# Patient Record
Sex: FEMALE | Race: WHITE | NOT HISPANIC OR LATINO | Employment: OTHER | URBAN - METROPOLITAN AREA
[De-identification: names, ages, dates, MRNs, and addresses within clinical notes are randomized per-mention and may not be internally consistent; named-entity substitution may affect disease eponyms.]

---

## 2017-01-18 DIAGNOSIS — I10 ESSENTIAL HYPERTENSION: ICD-10-CM

## 2017-01-18 DIAGNOSIS — E78.5 HYPERLIPIDEMIA, UNSPECIFIED HYPERLIPIDEMIA TYPE: ICD-10-CM

## 2017-01-19 RX ORDER — LOVASTATIN 20 MG/1
TABLET ORAL
Qty: 90 TAB | Refills: 0 | Status: SHIPPED | OUTPATIENT
Start: 2017-01-19 | End: 2017-12-29 | Stop reason: SDUPTHER

## 2017-01-19 NOTE — TELEPHONE ENCOUNTER
Pt has not 2had OV within the 12 month protocol and lipid panel is from 11/14. 3 month supply sent to pharmacy. Please schedule patient for an appt and fasting labs.  Lab Results   Component Value Date/Time    CHOLESTEROL, 11/06/2014 09:20 AM    LDL 98 11/06/2014 09:20 AM    HDL 47 11/06/2014 09:20 AM    TRIGLYCERIDES 111 11/06/2014 09:20 AM       Lab Results   Component Value Date/Time    SODIUM 139 11/06/2014 09:20 AM    POTASSIUM 4.0 11/06/2014 09:20 AM    CHLORIDE 104 11/06/2014 09:20 AM    CO2 27 11/06/2014 09:20 AM    GLUCOSE 90 11/06/2014 09:20 AM    BUN 14 11/06/2014 09:20 AM    CREATININE 0.91 11/06/2014 09:20 AM     Lab Results   Component Value Date/Time    ALKALINE PHOSPHATASE 81 11/06/2014 09:20 AM    AST(SGOT) 15 11/06/2014 09:20 AM    ALT(SGPT) 14 11/06/2014 09:20 AM    TOTAL BILIRUBIN 0.5 11/06/2014 09:20 AM

## 2017-03-28 RX ORDER — METOPROLOL SUCCINATE 25 MG/1
TABLET, EXTENDED RELEASE ORAL
Qty: 30 TAB | Refills: 0 | Status: SHIPPED | OUTPATIENT
Start: 2017-03-28 | End: 2017-07-02 | Stop reason: SDUPTHER

## 2017-03-28 NOTE — TELEPHONE ENCOUNTER
Pt was told 1/17 to make appt prior to more refills and that has not been done. Please advise pt to make appt and get labs done as next request will be denied.

## 2017-03-28 NOTE — TELEPHONE ENCOUNTER
Was the patient seen in the last year in this department? No     Does patient have an active prescription for medications requested? No     Received Request Via: Patient      Pt met protocol?: No, last ov 12/16/15, last labs 11/6/14  BP Readings from Last 1 Encounters:   12/16/15 128/88

## 2017-05-10 ENCOUNTER — OFFICE VISIT (OUTPATIENT)
Dept: MEDICAL GROUP | Facility: PHYSICIAN GROUP | Age: 62
End: 2017-05-10
Payer: COMMERCIAL

## 2017-05-10 VITALS
BODY MASS INDEX: 40.98 KG/M2 | HEART RATE: 88 BPM | DIASTOLIC BLOOD PRESSURE: 82 MMHG | RESPIRATION RATE: 14 BRPM | HEIGHT: 66 IN | WEIGHT: 255 LBS | OXYGEN SATURATION: 95 % | TEMPERATURE: 97 F | SYSTOLIC BLOOD PRESSURE: 124 MMHG

## 2017-05-10 DIAGNOSIS — F41.0 PANIC ATTACK: ICD-10-CM

## 2017-05-10 DIAGNOSIS — Z12.39 SCREENING FOR BREAST CANCER: ICD-10-CM

## 2017-05-10 DIAGNOSIS — E66.01 MORBID OBESITY WITH BMI OF 40.0-44.9, ADULT (HCC): ICD-10-CM

## 2017-05-10 DIAGNOSIS — I10 ESSENTIAL HYPERTENSION: ICD-10-CM

## 2017-05-10 DIAGNOSIS — Z12.11 SCREENING FOR COLON CANCER: ICD-10-CM

## 2017-05-10 DIAGNOSIS — M77.12 LEFT TENNIS ELBOW: ICD-10-CM

## 2017-05-10 DIAGNOSIS — E78.5 HYPERLIPIDEMIA, UNSPECIFIED HYPERLIPIDEMIA TYPE: ICD-10-CM

## 2017-05-10 PROCEDURE — 99203 OFFICE O/P NEW LOW 30 MIN: CPT | Performed by: INTERNAL MEDICINE

## 2017-05-10 RX ORDER — METOPROLOL SUCCINATE 25 MG/1
25 TABLET, EXTENDED RELEASE ORAL DAILY
Qty: 30 TAB | Refills: 5 | Status: SHIPPED | OUTPATIENT
Start: 2017-05-10 | End: 2017-05-10

## 2017-05-10 RX ORDER — CLONAZEPAM 0.5 MG/1
0.25 TABLET ORAL 2 TIMES DAILY PRN
Qty: 30 TAB | Refills: 0 | Status: SHIPPED | OUTPATIENT
Start: 2017-05-10 | End: 2018-11-07 | Stop reason: SDUPTHER

## 2017-05-10 ASSESSMENT — PATIENT HEALTH QUESTIONNAIRE - PHQ9: CLINICAL INTERPRETATION OF PHQ2 SCORE: 2

## 2017-05-10 NOTE — MR AVS SNAPSHOT
"        Rica Barrera   5/10/2017 10:40 AM   Office Visit   MRN: [id number]    Department:  [facility]   Dept Phone:  [phone]    Description:  Female : 1955   Provider:  RESOURCE PROVIDER [facility]NY           Reason for Visit     Medication Refill clonazepam, metoprolol,     Elbow Pain lft elbow pain x 10 days      Allergies as of 5/10/2017     No Known Allergies      You were diagnosed with     Screening for breast cancer   [229862]       Screening for colon cancer   [251615]       Morbid obesity with BMI of 40.0-44.9, adult (CMS-HCC)   [048122]       Left tennis elbow   [063383]       Panic attack   [524851]       Hyperlipidemia, unspecified hyperlipidemia type   [0190288]       Essential hypertension   [3455369]         Vital Signs     Blood Pressure Pulse Temperature Respirations Height Weight    124/82 mmHg 88 36.1 °C (97 °F) 14 1.676 m (5' 5.98\") 115.667 kg (255 lb)    Body Mass Index Oxygen Saturation Smoking Status             41.18 kg/m2 95% Former Smoker         Basic Information     Date Of Birth Sex Race Ethnicity Preferred Language    1955 Female White Non- English      Problem List              ICD-10-CM Priority Class Noted - Resolved    HLD (hyperlipidemia) E78.5   3/17/2014 - Present    Panic attack F41.0   3/17/2014 - Present    PAF (paroxysmal atrial fibrillation) (CMS-HCC) I48.0   3/17/2014 - Present    Abnormal finding on thallium stress test R94.39   3/17/2014 - Present    Breast cancer screening Z12.39   2014 - Present    Essential hypertension I10   2016 - Present    Morbid obesity with BMI of 40.0-44.9, adult ([facility]) E66.01, Z68.41   5/10/2017 - Present    Left tennis elbow M77.12   5/10/2017 - Present      Health Maintenance        Date Due Completion Dates    IMM DTaP/Tdap/Td Vaccine (1 - Tdap) 1972 ---    COLONOSCOPY 2003 ---    MAMMOGRAM 3/23/2007 3/23/2006    IMM ZOSTER VACCINE 2013 ---    PAP SMEAR 10/28/2017 10/28/2014 (Declined)   " Override on 10/28/2014: Patient Declined (followed by GYN)            Current Immunizations     No immunizations on file.      Below and/or attached are the medications your provider expects you to take. Review all of your home medications and newly ordered medications with your provider and/or pharmacist. Follow medication instructions as directed by your provider and/or pharmacist. Please keep your medication list with you and share with your provider. Update the information when medications are discontinued, doses are changed, or new medications (including over-the-counter products) are added; and carry medication information at all times in the event of emergency situations     Allergies:  No Known Allergies          Medications  Valid as of: May 10, 2017 - 11:06 AM    Generic Name Brand Name Tablet Size Instructions for use    Aspirin (Tab) aspirin 81 MG Take 162 mg by mouth every day.        B Complex Vitamins   Take  by mouth.        ClonazePAM (Tab) KLONOPIN 0.5 MG Take 0.5 Tabs by mouth 2 times a day as needed.        Coenzyme Q10   Take  by mouth every day.        Lovastatin (Tab) MEVACOR 20 MG TAKE ONE TABLET BY MOUTH ONCE DAILY        Magnesium   Take  by mouth.        Metoprolol Succinate (TABLET SR 24 HR) TOPROL XL 25 MG TAKE ONE-HALF TABLET BY MOUTH ONCE DAILY        .                 Medicines prescribed today were sent to:     [facility] [address] [place] [id number]    Phone: [phone] Fax: [phone]    Open 24 Hours?: No      Medication refill instructions:       If your prescription bottle indicates you have medication refills left, it is not necessary to call your provider’s office. Please contact your pharmacy and they will refill your medication.    If your prescription bottle indicates you do not have any refills left, you may request refills at any time through one of the following ways: The online [employer] system  (except Urgent Care), by calling your provider’s office, or by asking your pharmacy to contact your provider’s office with a refill request. Medication refills are processed only during regular business hours and may not be available until the next business day. Your provider may request additional information or to have a follow-up visit with you prior to refilling your medication.   *Please Note: Medication refills are assigned a new Rx number when refilled electronically. Your pharmacy may indicate that no refills were authorized even though a new prescription for the same medication is available at the pharmacy. Please request the medicine by name with the pharmacy before contacting your provider for a refill.        Your To Do List     Future Labs/Procedures Complete By Expires    COMP METABOLIC PANEL  As directed 5/11/2018    LIPID PROFILE  As directed 5/11/2018    MA-SCREEN MAMMO W/CAD-BILAT  As directed 5/10/2018      Referral     A referral request has been sent to our patient care coordination department. Please allow 3-5 business days for us to process this request and contact you either by phone or mail. If you do not hear from us by the 5th business day, please call us at [phone].           [employer] Access Code: Activation code not generated  Current [employer] Status: Active

## 2017-05-10 NOTE — PROGRESS NOTES
Chief Complaint   Patient presents with   • Medication Refill     clonazepam, metoprolol,    • Elbow Pain     lft elbow pain x 10 days       HISTORY OF PRESENT ILLNESS: Patient is a 61 y.o. female patient who presents today to discuss the evaluation and management of the following: She has not been in the office since December 2015, and has not had blood work since 2014.    1. Screening for breast cancer    Patient has not had a mammogram since 2006.    2. Screening for colon cancer    Patient has never had a colonoscopy, she has done guaiacs in the remote past. 2 of her children have Crohn's disease there is no history of colon cancer.    3. Morbid obesity with BMI of 40.0-44.9, adult (CMS-[facility])    We note that the patient has gained almost 20 pounds since her last visit here. She has however quit smoking successfully. She was smoking one pack per day for 50 years prior to November when she stopped. She also has retired from work and with these changes has been eating a lot of sweets she does a lot of baking for her grandchildren. She does not do any regular aerobic exercise, she takes slow walks with her dog daily.    4. Left tennis elbow    Patient is complaining of left elbow pain for about 10 days. It started after she was doing some gardening and using clippers. She has tried taking Advil OTC with some relief.    5. Panic attack    Patient has rare panic attacks for which she takes one half of a clonazepam tablet. Her last prescription was greater than one year ago.    6. Hyperlipidemia, unspecified hyperlipidemia type    Most recent labs were in 2014. Patient was taking lovastatin 20 mg, however she ran out several months ago. She is not currently watching her diet at all. We agree to check labs today and resume medication if indicated.    7. Essential hypertension          Patient Active Problem List    Diagnosis Date Noted   • Morbid obesity with BMI of 40.0-44.9, adult ([facility]) 05/10/2017   • Left tennis elbow  "05/10/2017   • Essential hypertension 08/25/2016   • Breast cancer screening 11/12/2014   • HLD (hyperlipidemia) 03/17/2014   • Panic attack 03/17/2014   • PAF (paroxysmal atrial fibrillation) (CMS-HCC) 03/17/2014   • Abnormal finding on thallium stress test 03/17/2014      Allergies:Review of patient's allergies indicates no known allergies.    Current meds including changes today  Current Outpatient Prescriptions   Medication Sig Dispense Refill   • MAGNESIUM PO Take  by mouth.     • B Complex Vitamins (VITAMIN B COMPLEX PO) Take  by mouth.     • clonazepam (KLONOPIN) 0.5 MG Tab Take 0.5 Tabs by mouth 2 times a day as needed. 30 Tab 0   • metoprolol SR (TOPROL XL) 25 MG TABLET SR 24 HR TAKE ONE-HALF TABLET BY MOUTH ONCE DAILY 30 Tab 0   • aspirin 81 MG tablet Take 162 mg by mouth every day.     • Coenzyme Q10 (CO Q 10 PO) Take  by mouth every day.     • lovastatin (MEVACOR) 20 MG Tab TAKE ONE TABLET BY MOUTH ONCE DAILY 90 Tab 0     No current facility-administered medications for this visit.     Social History   Substance Use Topics   • Smoking status: Former Smoker -- 1.00 packs/day     Types: Cigarettes     Quit date: 11/01/2015   • Smokeless tobacco: Never Used   • Alcohol Use: No     Social History     Social History Narrative       Family History   Problem Relation Age of Onset   • Heart Disease Mother    • Hypertension Mother    • Dementia Father    • Cancer Father      spine   • Diabetes Father    • Cancer Sister 41     breast   • Cancer Maternal Grandmother      leukemia       Review of Systems:  No chest pain, No shortness of breath, No dyspnea on exertion  Gastrointestinal ROS: No abdominal pain, No nausea, vomiting, diarrhea, or constipation        Exam:    Pleasant overweight female no distress  Blood pressure 124/82, pulse 88, temperature 36.1 °C (97 °F), resp. rate 14, height 1.676 m (5' 5.98\"), weight 115.667 kg (255 lb), SpO2 95 %.  General:  Well nourished, well developed female in NAD affect and " mood within normal limits  Head is grossly normal.  Neck: Supple without adenopathy  Pulmonary: Clear to ausculation.  Normal effort. No rales, rhonchi, or wheezing.  Cardiovascular: Regular rate and rhythm without murmur.   Extremities: no clubbing, cyanosis, or edema. Point tenderness above left lateral epicondyle.   Neuro: moves all extremities symmetrically    Please note that this dictation was created using voice recognition software. I have made every reasonable attempt to correct obvious errors, but I expect that there are errors of grammar and possibly content that I did not discover before finalizing the note.    Assessment/Plan:  1. Screening for breast cancer      - MA-SCREEN MAMMO W/CAD-BILAT; Future    2. Screening for colon cancer    Patient agrees to have screening colonoscopy.  - REFERRAL TO GASTROENTEROLOGY    3. Morbid obesity with BMI of 40.0-44.9, adult (CMS-[facility])    Discussed starting an aerobic exercise program, and trying to cut down on the amount of sweets that she is eating.  - Patient identified as having weight management issue.  Appropriate orders and counseling given.    4. Left tennis elbow    Discussed icing and use of anti-inflammatories, and avoiding exacerbating activities. Patient declines referral for PT she will see how it feels in a few weeks.    5. Panic attack    [place]  checked, patient is utilizing refills appropriately. Last refill January 2016.  - clonazepam (KLONOPIN) 0.5 MG Tab; Take 0.5 Tabs by mouth 2 times a day as needed.  Dispense: 30 Tab; Refill: 0    6. Hyperlipidemia, unspecified hyperlipidemia type    Check fasting lipids off medications, reinstitute if indicated.  - LIPID PROFILE; Future    7. Essential hypertension    Patient currently on very low-dose metoprolol, this was actually started for paroxysmal atrial fibrillation. I advised her that she could probably stop it at this point and we could just follow her blood pressure.  - COMP METABOLIC PANEL;  Future    Followup: Return in about 6 months (around 11/10/2017).

## 2017-07-03 NOTE — TELEPHONE ENCOUNTER
Was the patient seen in the last year in this department? Yes     Does patient have an active prescription for medications requested? No     Received Request Via: Pharmacy      Pt met protocol?: Yes, OV 5/10, but pt still needs to get labs done.   BP Readings from Last 1 Encounters:   05/10/17 124/82

## 2017-07-05 RX ORDER — METOPROLOL SUCCINATE 25 MG/1
TABLET, EXTENDED RELEASE ORAL
Qty: 30 TAB | Refills: 0 | Status: SHIPPED | OUTPATIENT
Start: 2017-07-05 | End: 2017-10-27 | Stop reason: SDUPTHER

## 2017-07-05 NOTE — TELEPHONE ENCOUNTER
Last seen by PCP 5/17. Discussed med DC. Will send 2 months to pharmacy. Please call and remind patient to get labs done.

## 2017-10-11 ENCOUNTER — TELEPHONE (OUTPATIENT)
Dept: MEDICAL GROUP | Facility: PHYSICIAN GROUP | Age: 62
End: 2017-10-11

## 2017-10-11 NOTE — TELEPHONE ENCOUNTER
----- Message from Maria Isabel Hassan M.D. sent at 10/11/2017 10:03 AM PDT -----  Mild hyperlipidemia and slight elevation in cholesterol.  Encourage consuming a dietary pattern that emphasizes intake of vegetables, fruits, and whole grains; includes low-fat dairy products, poultry, fish, legumes, nontropical vegetable oils and nuts; and limits intake of sweets, sugar-sweetened beverages and red meats.  Look up DASH diet for more information.

## 2017-10-30 NOTE — TELEPHONE ENCOUNTER
Was the patient seen in the last year in this department? Yes     Does patient have an active prescription for medications requested? No     Received Request Via: Pharmacy      Pt met protocol?: Yes    LAST OV 5/17

## 2017-10-31 RX ORDER — METOPROLOL SUCCINATE 25 MG/1
TABLET, EXTENDED RELEASE ORAL
Qty: 45 TAB | Refills: 0 | Status: SHIPPED | OUTPATIENT
Start: 2017-10-31 | End: 2017-12-29 | Stop reason: SDUPTHER

## 2017-12-29 ENCOUNTER — OFFICE VISIT (OUTPATIENT)
Dept: MEDICAL GROUP | Facility: PHYSICIAN GROUP | Age: 62
End: 2017-12-29
Payer: COMMERCIAL

## 2017-12-29 VITALS
OXYGEN SATURATION: 97 % | HEIGHT: 66 IN | WEIGHT: 243 LBS | DIASTOLIC BLOOD PRESSURE: 72 MMHG | TEMPERATURE: 97.1 F | SYSTOLIC BLOOD PRESSURE: 124 MMHG | HEART RATE: 84 BPM | BODY MASS INDEX: 39.05 KG/M2 | RESPIRATION RATE: 18 BRPM

## 2017-12-29 DIAGNOSIS — Z12.11 SCREENING FOR COLON CANCER: ICD-10-CM

## 2017-12-29 DIAGNOSIS — Z23 NEED FOR VACCINATION: ICD-10-CM

## 2017-12-29 DIAGNOSIS — E78.5 HYPERLIPIDEMIA, UNSPECIFIED HYPERLIPIDEMIA TYPE: ICD-10-CM

## 2017-12-29 DIAGNOSIS — E66.9 OBESITY (BMI 35.0-39.9 WITHOUT COMORBIDITY): ICD-10-CM

## 2017-12-29 DIAGNOSIS — I10 ESSENTIAL HYPERTENSION: ICD-10-CM

## 2017-12-29 DIAGNOSIS — I48.0 PAF (PAROXYSMAL ATRIAL FIBRILLATION) (HCC): ICD-10-CM

## 2017-12-29 DIAGNOSIS — E55.9 VITAMIN D DEFICIENCY: ICD-10-CM

## 2017-12-29 DIAGNOSIS — Z12.39 SCREENING FOR BREAST CANCER: ICD-10-CM

## 2017-12-29 PROCEDURE — 99214 OFFICE O/P EST MOD 30 MIN: CPT | Mod: 25 | Performed by: FAMILY MEDICINE

## 2017-12-29 PROCEDURE — 90471 IMMUNIZATION ADMIN: CPT | Performed by: FAMILY MEDICINE

## 2017-12-29 PROCEDURE — 90686 IIV4 VACC NO PRSV 0.5 ML IM: CPT | Performed by: FAMILY MEDICINE

## 2017-12-29 RX ORDER — LOVASTATIN 20 MG/1
TABLET ORAL
Qty: 90 TAB | Refills: 3 | Status: SHIPPED | OUTPATIENT
Start: 2017-12-29 | End: 2019-03-08 | Stop reason: SDUPTHER

## 2017-12-29 RX ORDER — METOPROLOL SUCCINATE 25 MG/1
TABLET, EXTENDED RELEASE ORAL
Qty: 45 TAB | Refills: 3 | Status: SHIPPED | OUTPATIENT
Start: 2017-12-29 | End: 2019-01-22 | Stop reason: SDUPTHER

## 2017-12-29 NOTE — PROGRESS NOTES
Chief Complaint   Patient presents with   • Follow-Up     labs       HISTORY OF PRESENT ILLNESS: Patient is a 62 y.o. female established patient here today for the following concerns:    1. Need for vaccination  Due for flu vaccine.     2. Hyperlipidemia, unspecified hyperlipidemia type  Here for follow up On labs which demonstrate return of hyperlipidemia. She was previously on lovastatin but had stopped it when she ran out of the medication. No history of coronary artery disease, history of paroxysmal atrial fibrillation on rate control and aspirin alone. Continues to smoke.    3. Vitamin D deficiency  Patient reports difficulty with muscle aches and joint pains. Previous history of vitamin D deficiency not currently taking any replacement.    4. PAF (paroxysmal atrial fibrillation) (CMS-[facility])  History of paroxysmal atrial fibrillation. Reports she has not had any palpitations dizziness lightheadedness or chest pains. Continues on the metoprolol and aspirin.    5. Essential hypertension  Blood pressure is been well controlled with metoprolol. Denies any headaches visual changes or chest pains.    6. Screening for breast cancer  Due for mammography.    7. Screening for colon cancer  Due for colon cancer screening, left foot testing over colonoscopy.    8. Obesity (BMI 35.0-39.9 without comorbidity)  Counseled on weight reduction today      Past Medical, Social, and Family history reviewed and updated in EPIC    Allergies:Patient has no known allergies.    Current Outpatient Prescriptions   Medication Sig Dispense Refill   • lovastatin (MEVACOR) 20 MG Tab TAKE ONE TABLET BY MOUTH ONCE DAILY 90 Tab 3   • metoprolol SR (TOPROL XL) 25 MG TABLET SR 24 HR TAKE ONE-HALF TABLET BY MOUTH ONCE DAILY 45 Tab 3   • MAGNESIUM PO Take  by mouth.     • B Complex Vitamins (VITAMIN B COMPLEX PO) Take  by mouth.     • clonazepam (KLONOPIN) 0.5 MG Tab Take 0.5 Tabs by mouth 2 times a day as needed. 30 Tab 0   • aspirin 81 MG tablet  "Take 162 mg by mouth every day.     • Coenzyme Q10 (CO Q 10 PO) Take  by mouth every day.       No current facility-administered medications for this visit.          ROS:  Review of Systems   Constitutional: Negative for fever, chills, weight loss and malaise/fatigue.   HENT: Negative for ear pain, nosebleeds, congestion, sore throat and neck pain.    Eyes: Negative for blurred vision.   Respiratory: Negative for cough, sputum production, shortness of breath and wheezing.    Cardiovascular: Negative for chest pain, palpitations,  and leg swelling.   Gastrointestinal: Negative for heartburn, nausea, vomiting, diarrhea and abdominal pain.   Genitourinary: Negative for dysuria, urgency and frequency.   Musculoskeletal: Negative for myalgias, back pain and joint pain.   Skin: Negative for rash and itching.   Neurological: Negative for dizziness, tingling, tremors, sensory change, focal weakness and headaches.   Endo/Heme/Allergies: Does not bruise/bleed easily.   Psychiatric/Behavioral: Negative for depression, anxiety, suicidal ideas, insomnia and memory loss.      Exam:  Blood pressure 124/72, pulse 84, temperature 36.2 °C (97.1 °F), resp. rate 18, height 1.676 m (5' 5.98\"), weight 110.2 kg (243 lb), SpO2 97 %.    General:  Well nourished, well developed in NAD  Head is grossly normal.  Neck: Supple without JVD   Pulmonary:  Normal effort.   Cardiovascular: Regular rate and rhythm no murmurs rubs or gallops  Extremities: no clubbing, cyanosis, or edema.  Psych: affect appropriate    Please note that this dictation was created using voice recognition software. I have made every reasonable attempt to correct obvious errors, but I expect that there are errors of grammar and possibly content that I did not discover before finalizing the note.    Assessment/Plan:  1. Need for vaccination  Counseled  - INFLUENZA VACCINE QUAD INJ >3Y(PF)    2. Hyperlipidemia, unspecified hyperlipidemia type  Restart lovastatin and check labs " in 3 months  - lovastatin (MEVACOR) 20 MG Tab; TAKE ONE TABLET BY MOUTH ONCE DAILY  Dispense: 90 Tab; Refill: 3  - COMP METABOLIC PANEL; Future  - LIPID PROFILE; Future    3. Vitamin D deficiency  Vitamin D 2000 5000 units daily check levels in the next 3 months  - VITAMIN D,25 HYDROXY; Future    4. PAF (paroxysmal atrial fibrillation) (CMS-HCC)  Continue  - metoprolol SR (TOPROL XL) 25 MG TABLET SR 24 HR; TAKE ONE-HALF TABLET BY MOUTH ONCE DAILY  Dispense: 45 Tab; Refill: 3  Continue aspirin daily    5. Essential hypertension  Continue  - metoprolol SR (TOPROL XL) 25 MG TABLET SR 24 HR; TAKE ONE-HALF TABLET BY MOUTH ONCE DAILY  Dispense: 45 Tab; Refill: 3    6. Screening for breast cancer  Check  - MA-SCREEN MAMMO W/CAD-BILAT; Future    7. Screening for colon cancer    - OCCULT BLOOD FECES IMMUNOASSAY; Future    8. Obesity (BMI 35.0-39.9 without comorbidity)    - Patient identified as having weight management issue.  Appropriate orders and counseling given.    Three-month follow-up sooner when necessary  Patient advised to schedule well woman exam for last Pap smear, if normal will discontinue screening

## 2018-02-16 ENCOUNTER — HOSPITAL ENCOUNTER (OUTPATIENT)
Dept: RADIOLOGY | Facility: MEDICAL CENTER | Age: 63
End: 2018-02-16
Attending: FAMILY MEDICINE
Payer: COMMERCIAL

## 2018-02-16 DIAGNOSIS — Z12.39 SCREENING FOR BREAST CANCER: ICD-10-CM

## 2018-02-16 PROCEDURE — 77067 SCR MAMMO BI INCL CAD: CPT

## 2018-11-07 ENCOUNTER — OFFICE VISIT (OUTPATIENT)
Dept: MEDICAL GROUP | Facility: PHYSICIAN GROUP | Age: 63
End: 2018-11-07
Payer: MEDICARE

## 2018-11-07 VITALS
BODY MASS INDEX: 41.3 KG/M2 | HEART RATE: 77 BPM | TEMPERATURE: 97.2 F | HEIGHT: 66 IN | OXYGEN SATURATION: 98 % | SYSTOLIC BLOOD PRESSURE: 110 MMHG | WEIGHT: 257 LBS | DIASTOLIC BLOOD PRESSURE: 62 MMHG

## 2018-11-07 DIAGNOSIS — Z12.11 SCREENING FOR COLON CANCER: ICD-10-CM

## 2018-11-07 DIAGNOSIS — Z23 NEED FOR INFLUENZA VACCINATION: ICD-10-CM

## 2018-11-07 DIAGNOSIS — S86.912A STRAIN OF LEFT KNEE, INITIAL ENCOUNTER: ICD-10-CM

## 2018-11-07 DIAGNOSIS — F41.0 PANIC ATTACK: ICD-10-CM

## 2018-11-07 DIAGNOSIS — Z23 NEED FOR VACCINATION AGAINST STREPTOCOCCUS PNEUMONIAE: ICD-10-CM

## 2018-11-07 DIAGNOSIS — Z13.6 SCREENING FOR CARDIOVASCULAR CONDITION: ICD-10-CM

## 2018-11-07 PROCEDURE — 90662 IIV NO PRSV INCREASED AG IM: CPT | Performed by: FAMILY MEDICINE

## 2018-11-07 PROCEDURE — G0009 ADMIN PNEUMOCOCCAL VACCINE: HCPCS | Performed by: FAMILY MEDICINE

## 2018-11-07 PROCEDURE — 90732 PPSV23 VACC 2 YRS+ SUBQ/IM: CPT | Performed by: FAMILY MEDICINE

## 2018-11-07 PROCEDURE — G0008 ADMIN INFLUENZA VIRUS VAC: HCPCS | Performed by: FAMILY MEDICINE

## 2018-11-07 PROCEDURE — 99214 OFFICE O/P EST MOD 30 MIN: CPT | Mod: 25 | Performed by: FAMILY MEDICINE

## 2018-11-07 RX ORDER — CLONAZEPAM 0.5 MG/1
0.25 TABLET ORAL 2 TIMES DAILY PRN
Qty: 30 TAB | Refills: 0 | Status: SHIPPED | OUTPATIENT
Start: 2018-11-07 | End: 2020-03-11 | Stop reason: SDUPTHER

## 2018-11-07 RX ORDER — MELOXICAM 7.5 MG/1
7.5 TABLET ORAL DAILY
Qty: 30 TAB | Refills: 0 | Status: SHIPPED
Start: 2018-11-07 | End: 2020-01-23 | Stop reason: CLARIF

## 2018-11-07 RX ORDER — ACETAMINOPHEN 160 MG
TABLET,DISINTEGRATING ORAL
COMMUNITY

## 2018-11-07 ASSESSMENT — PATIENT HEALTH QUESTIONNAIRE - PHQ9
SUM OF ALL RESPONSES TO PHQ QUESTIONS 1-9: 5
CLINICAL INTERPRETATION OF PHQ2 SCORE: 2
5. POOR APPETITE OR OVEREATING: 0 - NOT AT ALL

## 2018-11-07 NOTE — PROGRESS NOTES
Chief Complaint   Patient presents with   • Knee Injury     L knee pain x 1wk, pt fell out of truck       HISTORY OF PRESENT ILLNESS: Patient is a 63 y.o. female established patient here today for the following concerns:    1. Panic attack  Patient is here for follow-up on anxiety.  She gets occasional panic attacks that she is in the past used clonazepam to treat.  She reports that she very rarely needs some and her prescription is starting to run low.  And she thinks it may have even  since her last use.  Denies any increase in anxiety.  No chest pains or palpitations.    2. Need for influenza vaccination  Patient is due for flu shot today.    3. Strain of left knee, initial encounter  She reports approximately 1 week ago she missed the last step climbing out of her 's big rig.  She hyperextended the left knee and since then has been having a lot of pain and stiffness.  She reports that she is tried using neoprene knee sleeve however this is made patellar pain worse.  Primarily pain is in the posterior aspect of the knee.  It does feel better if she ambulates on the ball of her foot rather than flat.  She denies any bruising or swelling into the calf.  There is been no instability sensation or sticking.    4. Screening for cardiovascular condition  Due for updated labs  5. Screening for colon cancer  Due for colon cancer screening, declines colonoscopy amendable to annual fit testing    6. Need for vaccination against Streptococcus pneumoniae  Due for pneumonia vaccination      Past Medical, Social, and Family history reviewed and updated in EPIC    Allergies:Patient has no known allergies.    Current Outpatient Prescriptions   Medication Sig Dispense Refill   • Cholecalciferol (VITAMIN D3) 2000 UNIT Cap Take  by mouth.     • Potassium (POTASSIMIN PO) Take  by mouth.     • Black Pepper-Turmeric (TURMERIC COMPLEX/BLACK PEPPER PO) Take  by mouth.     • clonazePAM (KLONOPIN) 0.5 MG Tab Take 0.5 Tabs by  "mouth 2 times a day as needed for up to 30 days. 30 Tab 0   • meloxicam (MOBIC) 7.5 MG Tab Take 1 Tab by mouth every day. Take with food 30 Tab 0   • lovastatin (MEVACOR) 20 MG Tab TAKE ONE TABLET BY MOUTH ONCE DAILY 90 Tab 3   • metoprolol SR (TOPROL XL) 25 MG TABLET SR 24 HR TAKE ONE-HALF TABLET BY MOUTH ONCE DAILY 45 Tab 3   • MAGNESIUM PO Take  by mouth.     • B Complex Vitamins (VITAMIN B COMPLEX PO) Take  by mouth.     • aspirin 81 MG tablet Take 162 mg by mouth every day.     • Coenzyme Q10 (CO Q 10 PO) Take  by mouth every day.       No current facility-administered medications for this visit.          ROS:  Review of Systems   Constitutional: Negative for fever, chills, weight loss and malaise/fatigue.   HENT: Negative for ear pain, nosebleeds, congestion, sore throat and neck pain.    Eyes: Negative for blurred vision.   Respiratory: Negative for cough, sputum production, shortness of breath and wheezing.    Cardiovascular: Negative for chest pain, palpitations,  and leg swelling.   Gastrointestinal: Negative for heartburn, nausea, vomiting, diarrhea and abdominal pain.   Genitourinary: Negative for dysuria, urgency and frequency.   Musculoskeletal: Negative for myalgias, back pain and joint pain.   Skin: Negative for rash and itching.   Neurological: Negative for dizziness, tingling, tremors, sensory change, focal weakness and headaches.   Endo/Heme/Allergies: Does not bruise/bleed easily.   Psychiatric/Behavioral: Negative for depression, anxiety, suicidal ideas, insomnia and memory loss.      Exam:  Blood pressure 110/62, pulse 77, temperature 36.2 °C (97.2 °F), temperature source Temporal, height 1.676 m (5' 6\"), weight 116.6 kg (257 lb), SpO2 98 %.    General:  Well nourished, well developed in NAD  Head is grossly normal.  Neck: Supple without JVD   Pulmonary:  Normal effort.   Cardiovascular: Regular rate  Extremities: no clubbing, cyanosis, or edema.  Psych: affect appropriate  Muscular skeletal: " Left knee without palpable effusion some mild tenderness in the medial joint space.  Tenderness in the popliteal fossa, more tender on the insertion point of the popliteus/gastrocnemius muscles.  No ligamentous laxity appreciated although exam is limited by body habitus.    Please note that this dictation was created using voice recognition software. I have made every reasonable attempt to correct obvious errors, but I expect that there are errors of grammar and possibly content that I did not discover before finalizing the note.    Assessment/Plan:  1. Panic attack  Renewed, discussed risks benefits and alternatives  - clonazePAM (KLONOPIN) 0.5 MG Tab; Take 0.5 Tabs by mouth 2 times a day as needed for up to 30 days.  Dispense: 30 Tab; Refill: 0    2. Need for influenza vaccination  - INFLUENZA VACCINE, HIGH DOSE (65+ ONLY)    3. Strain of left knee, initial encounter  We will start physical therapy as well as meloxicam with food for 1 month.  Consider Ace bandage for little compression.  At this time no indication for x-ray or MRI.  - REFERRAL TO PHYSICAL THERAPY Reason for Therapy: Eval/Treat/Report  - meloxicam (MOBIC) 7.5 MG Tab; Take 1 Tab by mouth every day. Take with food  Dispense: 30 Tab; Refill: 0    4. Screening for cardiovascular condition  - COMP METABOLIC PANEL; Future  - LIPID PROFILE; Future    5. Screening for colon cancer  - OCCULT BLOOD FECES IMMUNOASSAY; Future    6. Need for vaccination against Streptococcus pneumoniae  - Pneumococal Polysaccharide Vaccine 23-Valent =>3YO SQ/IM    Follow-up in 4-6 weeks

## 2018-11-19 ENCOUNTER — APPOINTMENT (OUTPATIENT)
Dept: RADIOLOGY | Facility: IMAGING CENTER | Age: 63
End: 2018-11-19
Attending: NURSE PRACTITIONER
Payer: MEDICARE

## 2018-11-19 ENCOUNTER — OFFICE VISIT (OUTPATIENT)
Dept: URGENT CARE | Facility: CLINIC | Age: 63
End: 2018-11-19
Payer: MEDICARE

## 2018-11-19 VITALS
DIASTOLIC BLOOD PRESSURE: 72 MMHG | BODY MASS INDEX: 41.3 KG/M2 | RESPIRATION RATE: 16 BRPM | OXYGEN SATURATION: 98 % | HEART RATE: 80 BPM | WEIGHT: 257 LBS | TEMPERATURE: 98.8 F | SYSTOLIC BLOOD PRESSURE: 116 MMHG | HEIGHT: 66 IN

## 2018-11-19 DIAGNOSIS — M25.562 ACUTE PAIN OF LEFT KNEE: ICD-10-CM

## 2018-11-19 DIAGNOSIS — S86.912D KNEE STRAIN, LEFT, SUBSEQUENT ENCOUNTER: ICD-10-CM

## 2018-11-19 PROCEDURE — 73564 X-RAY EXAM KNEE 4 OR MORE: CPT | Mod: TC,FY,LT | Performed by: PHYSICIAN ASSISTANT

## 2018-11-19 PROCEDURE — 99213 OFFICE O/P EST LOW 20 MIN: CPT | Performed by: NURSE PRACTITIONER

## 2018-11-19 ASSESSMENT — ENCOUNTER SYMPTOMS
FALLS: 1
TINGLING: 0
SENSORY CHANGE: 0
FOCAL WEAKNESS: 0

## 2018-11-19 NOTE — PROGRESS NOTES
"Subjective:      Rica Barrera is a 63 y.o. female who presents with Knee Injury (RT knee injury)            Patient reports that 3 weeks ago she missed the last step climbing out of her 's big rig.  She hyperextended the left knee as she fell to the ground and since then has been having a lot of pain and stiffness.  She initially tried a neoprene sleeve which made it feel worse.  She denies any bruising or swelling into the calf.  There is been no instability sensation or locking.  No numbness, tingling, or weakness.  She was evaluated by her PCP 12 days ago and advised to trial Mobic and follow up with physical therapy.  She has tried the mobic with no relief.  Rates pain 8/10 with movement.  Pain improves with non-weight bearing rest.  She has not scheduled to see physical therapy.  She understands that x-ray is not likely to yield any findings regarding this acute injury, but is requesting baseline x-ray today.  She is nervous to try physical therapy fearing there is something significantly wrong with her knee that may need surgical repair.               Review of Systems   Musculoskeletal: Positive for falls and joint pain.   Neurological: Negative for tingling, sensory change and focal weakness.     Medications, Allergies, and current problem list reviewed today in Epic     Objective:     /72 (BP Location: Left arm, Patient Position: Sitting, BP Cuff Size: Large adult)   Pulse 80   Temp 37.1 °C (98.8 °F) (Temporal)   Resp 16   Ht 1.676 m (5' 6\")   Wt 116.6 kg (257 lb)   SpO2 98%   BMI 41.48 kg/m²      Physical Exam   Constitutional: She is oriented to person, place, and time. She appears well-developed and well-nourished. No distress.   Obese female with BMI of 41.48   Cardiovascular: Normal rate.    Musculoskeletal:   Left knee is warm, dry, and intact with no bruising, swelling, erythema, rash or lesion.  Obesity obscures landmarks.  Patella tracks midline.  No joint instability.  Posterior " knee TTP.  Negative anterior and posterior drawer.  Pain on varus and valgus stress.  ROM intact with pain on full extension.  No catching or locking.  NV and sensation intact.  Antalgic gait.     Neurological: She is alert and oriented to person, place, and time.   Skin: She is not diaphoretic.   Vitals reviewed.    [facility]  [address] [place] [address] [place] [address] Bruce, Harling[place] J  MRN: [id number], : 1955, Sex: F  Encounter date: 2018   Order   DX-KNEE COMPLETE 4+ LEFT [[id number]] (Order [id number])   Reviewed by SPENCER Craig on 2018  2:28 PM   Images     Show images for DX-KNEE COMPLETE 4+ LEFT   View SmartLink Info     DX-KNEE COMPLETE 4+ (Order #[id number]) on 18   Narrative       2018 2:03 PM    HISTORY/REASON FOR EXAM:  Pain/Deformity Following Trauma.      TECHNIQUE/EXAM DESCRIPTION AND NUMBER OF VIEWS:  4 views of the LEFT knee.    COMPARISON: None    FINDINGS:  The alignment and mineralization are normal. No joint space narrowing is appreciated. Spurs extend from the posterior patella and tibial spines. No fractures or dislocations are appreciated.   Impression       No radiographic evidence of acute traumatic bone injury.    Mild degenerative spurring.   Reading Provider Reading Date   Deandra Wilson M.D. 2018   Signing Provider Signing Date Signing Time   Deandra Wilson M.D. 2018  2:17 PM   Lab Information     Lab   RADIOLOGY              Last Resulted Time   Mon 2018  2:19 PM   Detailed Information     Priority and Order Details           Collection Information     Resulting Agency: RADIOLOGY      Order-Level Documents:     There are no order-level documents.   Order Detail Report     DX-KNEE COMPLETE 4+ (Order #[id number]) on 18   Order-Level Documents:     There are no order-level documents.   Encounter-Level Documents:     There are no encounter-level documents.   Order-Level Documents for Parent Order:     There are no  order-level documents for parent order.   Encounter-Level Documents for Parent Order:     There are no encounter-level documents for parent order.   DX-KNEE COMPLETE 4+ LEFT [[id number]]     Electronically signed by: SPENCER Longoria on 11/19/18 1332 Status: Completed   Ordering user: SPENCER Longoria 11/19/18 1332 Authorized by: SPENCER Longoria   Ordered during: Office Visit on 11/19/2018   Frequency:  11/19/18 -     Diagnoses  Acute pain of left knee [M25.562]   Questionnaire     Question Answer   Left/Right Indicator: LEFT   Reason For Exam: Pain/Deformity Following Trauma   Is the patient pregnant? No   Comments to Radiology Fell out of truck 3 weeks ago.  Persistent left knee pain at posterior knee and patella region.               Assessment/Plan:     1. Knee strain, left, subsequent encounter    2. Acute pain of left knee  - DX-KNEE COMPLETE 4+ LEFT; Future    Discussed exam findings and imaging results with patient.  Differential for knee pain reviewed.  Continue Mobic as prescribed with OTC tylenol for breakthrough pain.  Follow up with physical therapy as referred.  Follow up with PCP for any persistent or worsening symptoms as MRI may be indicated.    Patient verbalized understanding of and agreed with plan of care.

## 2018-11-27 ENCOUNTER — HOSPITAL ENCOUNTER (OUTPATIENT)
Dept: LAB | Facility: MEDICAL CENTER | Age: 63
End: 2018-11-27
Attending: FAMILY MEDICINE
Payer: MEDICARE

## 2018-12-11 NOTE — OP THERAPY EVALUATION
Outpatient Physical Therapy  INITIAL EVALUATION    [facility] [place]  [address], [address]  [place] [address]  Phone:  [phone]  Fax:  [phone]    Date of Evaluation: 01/04/2019    Patient: Rica Barrera  YOB: 1955  MRN: [id number]     Referring Provider: Maria Isabel Hassan M.D.  [address]  [place] [address]   Referring Diagnosis Strain of left knee, initial encounter [S86.912A]     Time Calculation  Start time: 1315  Stop time: 1400 Time Calculation (min): 45 minutes     Physical Therapy Occurrence Codes    Date of onset of impairment:  11/1/18   Date physical therapy care plan established or reviewed:  1/4/19   Date physical therapy treatment started:  1/4/19          Chief Complaint: L knee problem    Visit Diagnoses     ICD-10-CM   1. Strain of knee and leg, left, initial encounter S86.912A       Subjective:   History of Present Illness:     Mechanism of injury:  Rica Barrera is a 63 y.o. female that presents to therapy with L knee pain since movement when she was getting out of big Rig. Pt states that her Leftt knee hyperextend.  Pain is now along the back and front of her knee. Patient denies fevers/chills, numbness/weakness of lower extremities, bowel/bladder incontinence, saddle anesthesia.      Aggravating factors: walking upstairs, getting dressed socks/shoes clipping nails. Walking with pain   Releiving factors: rest, sitting.     ADL limitations: difficulty walking, ADLs as above.         Past Medical History:   Diagnosis Date   • Hyperlipidemia      Past Surgical History:   Procedure Laterality Date   • PRIMARY C SECTION     • VEIN STRIPPING       Social History   Substance Use Topics   • Smoking status: Current Every Day Smoker     Packs/day: 0.50     Types: Cigarettes   • Smokeless tobacco: Never Used   • Alcohol use No     Family and Occupational History     Social History   • Marital status:      Spouse name: N/A   • Number of  children: N/A   • Years of education: N/A       Objective     Neurological Testing     Sensation     Knee   Left Knee   Intact: light touch    Right Knee   Intact: light touch     Reflexes   Left   Babinski sign: negative  Clonus sign: negative    Right   Babinski sign: negative  Clonus sign: negative    Active Range of Motion   Left Knee   Flexion: 100 degrees with pain  Extension: 5 degrees with pain    Right Knee   Flexion: 125 degrees   Extension: 0 degrees     Passive Range of Motion   Left Knee   Flexion: 100 degrees with pain  Extension: 0 degrees with pain    Additional Passive Range of Motion Details  Guarding present into flexion,.     Patellar Mobility   Left Knee Patellar tendons within functional limits include the medial and lateral.     Strength:      Left Knee   Flexion: 5  Extension: 5    Tests     Left Knee   Positive lateral Mathew and medial Mathew.   Negative patellar apprehension, patellar compression, patella-femoral grind, valgus stress test at 0 degrees, valgus stress test at 30 degrees, varus stress test at 0 degrees and varus stress test at 30 degrees.     Additional Tests Details  Unable to perform lachman's due to size of LE.         Therapeutic Exercises (CPT 69748):       Therapeutic Exercise Summary: HEP instruction/performance and development. Handout provided and exercises located below:  Access Code: [email or web address]   URL: [email or web address]   Date: 01/04/2019   Prepared by: Burton Bledsoe      Exercises  · Supine Heel Slide with Strap - 20 reps - 2x daily - 7x weekly  · Supine Quad Set on Towel Roll - 20 reps - 3 hold - 2x daily - 7x weekly  · Seated Short Arc Quad - 3 reps - 2 sets - 15x daily - 7x weekly      Time-based treatments/modalities:  Therapeutic exercise minutes (CPT 10014): 15 minutes       Assessment, Response and Plan:   Assessment details:  Crossrobb Barrera is a 63 y.o. female with signs and symptoms consistent with knee osteoarthritis/meniscal tearing. She requires  skilled physical therapy intervention to decrease pain, increase range of motion, increase functional mobility, improve ADL completion and establish a home exercise program.    Pt would like to attend therapy closer to home in [place]. She is being discharged at this time and recommendation for a referral to physical therapy in [place] will be put forth.       Plan:   Therapy options:  Discharged due to patient/family choice    Functional Limitation G-Codes and Severity Modifiers  PT Functional Assessment Tool Used: LEFS  PT Functional Assessment Score: 21/44     Referring provider co-signature:  I have reviewed this plan of care and my co-signature certifies the need for services.  Certification Dates:   From 01/04/19      To 01/04/19     Physician Signature: ________________________________ Date: ______________

## 2019-01-04 ENCOUNTER — PHYSICAL THERAPY (OUTPATIENT)
Dept: PHYSICAL THERAPY | Facility: REHABILITATION | Age: 64
End: 2019-01-04
Attending: FAMILY MEDICINE
Payer: MEDICARE

## 2019-01-04 DIAGNOSIS — S86.912A STRAIN OF KNEE AND LEG, LEFT, INITIAL ENCOUNTER: ICD-10-CM

## 2019-01-04 PROCEDURE — 97110 THERAPEUTIC EXERCISES: CPT

## 2019-01-04 PROCEDURE — 97161 PT EVAL LOW COMPLEX 20 MIN: CPT

## 2019-01-17 ENCOUNTER — PATIENT MESSAGE (OUTPATIENT)
Dept: MEDICAL GROUP | Facility: PHYSICIAN GROUP | Age: 64
End: 2019-01-17

## 2019-01-22 DIAGNOSIS — I48.0 PAF (PAROXYSMAL ATRIAL FIBRILLATION) (HCC): ICD-10-CM

## 2019-01-22 DIAGNOSIS — I10 ESSENTIAL HYPERTENSION: ICD-10-CM

## 2019-01-22 RX ORDER — METOPROLOL SUCCINATE 25 MG/1
12.5 TABLET, EXTENDED RELEASE ORAL DAILY
Qty: 45 TAB | Refills: 1 | Status: SHIPPED | OUTPATIENT
Start: 2019-01-22 | End: 2019-10-12 | Stop reason: SDUPTHER

## 2019-01-22 NOTE — TELEPHONE ENCOUNTER
Was the patient seen in the last year in this department? Yes    Does patient have an active prescription for medications requested? No     Received Request Via: Pharmacy    Pt met protocol?: Yes     Last OV 11/2018    BP Readings from Last 1 Encounters:   11/19/18 116/72

## 2019-01-22 NOTE — TELEPHONE ENCOUNTER
Refill X 6 months, sent to pharmacy.Pt. Seen in the last 6 months per protocol. Labs scanned in from [facility] 1/19 and WNL.

## 2019-03-08 DIAGNOSIS — E78.5 HYPERLIPIDEMIA, UNSPECIFIED HYPERLIPIDEMIA TYPE: ICD-10-CM

## 2019-03-11 RX ORDER — LOVASTATIN 20 MG/1
TABLET ORAL
Qty: 90 TAB | Refills: 1 | Status: SHIPPED | OUTPATIENT
Start: 2019-03-11 | End: 2019-03-25 | Stop reason: SDUPTHER

## 2019-03-11 NOTE — TELEPHONE ENCOUNTER
Pt has had OV within the 12 month protocol and lipid panel is current. 6 month supply sent to pharmacy. Lipids scanned in from [facility] 1/19.

## 2019-03-25 ENCOUNTER — OFFICE VISIT (OUTPATIENT)
Dept: MEDICAL GROUP | Facility: PHYSICIAN GROUP | Age: 64
End: 2019-03-25
Payer: MEDICARE

## 2019-03-25 VITALS
SYSTOLIC BLOOD PRESSURE: 118 MMHG | HEIGHT: 66 IN | DIASTOLIC BLOOD PRESSURE: 70 MMHG | HEART RATE: 75 BPM | WEIGHT: 253 LBS | BODY MASS INDEX: 40.66 KG/M2 | RESPIRATION RATE: 18 BRPM | TEMPERATURE: 98 F | OXYGEN SATURATION: 97 %

## 2019-03-25 DIAGNOSIS — R73.01 IMPAIRED FASTING GLUCOSE: ICD-10-CM

## 2019-03-25 DIAGNOSIS — M25.562 ACUTE PAIN OF LEFT KNEE: ICD-10-CM

## 2019-03-25 DIAGNOSIS — I73.9 CLAUDICATION (HCC): ICD-10-CM

## 2019-03-25 DIAGNOSIS — E78.5 HYPERLIPIDEMIA, UNSPECIFIED HYPERLIPIDEMIA TYPE: ICD-10-CM

## 2019-03-25 PROCEDURE — 99214 OFFICE O/P EST MOD 30 MIN: CPT | Performed by: FAMILY MEDICINE

## 2019-03-25 RX ORDER — LOVASTATIN 40 MG/1
TABLET ORAL
Qty: 90 TAB | Refills: 3 | Status: SHIPPED
Start: 2019-03-25 | End: 2020-02-10

## 2019-03-25 NOTE — PROGRESS NOTES
Chief Complaint   Patient presents with   • Knee Injury     Lt side        HISTORY OF PRESENT ILLNESS: Patient is a 63 y.o. female established patient here today for the following concerns:    1. Acute pain of left knee  Flexion injury to the left knee again.  Reports slipped, with leg bending behind her and laterally 3 weeks ago.  Pain is improving some.  No locking or giving way.  Pain located in the medial and anterior portion of the knee into the thigh and lower leg.  Ibuprofen is helping some.  Tried to wrap it with ace wrap but cut off the circulation in the lower leg so stopped.  Did have PT consult last injury and would like to start working on the exercises previously given.      2. Claudication (HCC)  In addition, notes that she has lost the hair on her legs.  She is a smoker and notes occasional muscle cramping while walking.  Wondering if her circulation has anything to do with the knee not healing.     3. Hyperlipidemia  Labs reviewed.  LDL >100, on lovastatin 20 mg.  Impaired fasting glucose.  No CP.  + Smoker.       Past Medical, Social, and Family history reviewed and updated in EPIC    Allergies:Patient has no known allergies.    Current Outpatient Prescriptions   Medication Sig Dispense Refill   • Misc Natural Products (TURMERIC CURCUMIN) Cap Take  by mouth.     • lovastatin (MEVACOR) 20 MG Tab TAKE ONE TABLET BY MOUTH ONCE DAILY 90 Tab 1   • metoprolol SR (TOPROL XL) 25 MG TABLET SR 24 HR Take 0.5 Tabs by mouth every day. 45 Tab 1   • Cholecalciferol (VITAMIN D3) 2000 UNIT Cap Take  by mouth.     • Potassium (POTASSIMIN PO) Take  by mouth.     • Black Pepper-Turmeric (TURMERIC COMPLEX/BLACK PEPPER PO) Take  by mouth.     • meloxicam (MOBIC) 7.5 MG Tab Take 1 Tab by mouth every day. Take with food 30 Tab 0   • MAGNESIUM PO Take  by mouth.     • B Complex Vitamins (VITAMIN B COMPLEX PO) Take  by mouth.     • aspirin 81 MG tablet Take 162 mg by mouth every day.     • Coenzyme Q10 (CO Q 10 PO) Take   "by mouth every day.       No current facility-administered medications for this visit.          ROS:  Review of Systems   Constitutional: Negative for fever, chills, weight loss and malaise/fatigue.   HENT: Negative for ear pain, nosebleeds, congestion, sore throat and neck pain.    Eyes: Negative for blurred vision.   Respiratory: Negative for cough, sputum production, shortness of breath and wheezing.    Cardiovascular: Negative for chest pain, palpitations,  and leg swelling.   Gastrointestinal: Negative for heartburn, nausea, vomiting, diarrhea and abdominal pain.   Genitourinary: Negative for dysuria, urgency and frequency.   Musculoskeletal: Negative for myalgias, back pain and +joint pain.   Skin: Negative for rash and itching.   Neurological: Negative for dizziness, tingling, tremors, sensory change, focal weakness and headaches.   Endo/Heme/Allergies: Does not bruise/bleed easily.   Psychiatric/Behavioral: Negative for depression, anxiety, suicidal ideas, insomnia and memory loss.      Exam:  Blood pressure 118/70, pulse 75, temperature 36.7 °C (98 °F), resp. rate 18, height 1.676 m (5' 6\"), weight 114.8 kg (253 lb), SpO2 97 %.    General:  Well nourished, well developed in NAD  Head is grossly normal.  Neck: Supple without JVD   Pulmonary:  Normal effort.   Cardiovascular: Regular rate decreased pedal pulses.   Extremities: no clubbing, cyanosis, or edema.  Psych: affect appropriate  MSK: tenderness over the muscle insertions of the medial knee.  No joint line tenderness, no effusion appreciated.  No ligamentous laxity although exam is limited by habitus.       Please note that this dictation was created using voice recognition software. I have made every reasonable attempt to correct obvious errors, but I expect that there are errors of grammar and possibly content that I did not discover before finalizing the note.    Assessment/Plan:  1. Acute pain of left knee  Knee sprain, suspect will heal on its " own.  No imaging indicated at this time.  Ice, NSAIDs, and home PT plan    2. Claudication (HCC)  Check for PAD  - US-EXTREMITY ARTERY LOWER BILAT W/EMILE (COMBO); Future  Smoking cessation, and regular walking when knee is ready    3. Hyperlipidemia  Increase to 40 mg of lovastatin.      4. Impaired fasting glucose  Check a1x

## 2019-10-12 DIAGNOSIS — I48.0 PAF (PAROXYSMAL ATRIAL FIBRILLATION) (HCC): ICD-10-CM

## 2019-10-12 DIAGNOSIS — I10 ESSENTIAL HYPERTENSION: ICD-10-CM

## 2019-10-15 RX ORDER — METOPROLOL SUCCINATE 25 MG/1
TABLET, EXTENDED RELEASE ORAL
Qty: 45 TAB | Refills: 1 | Status: SHIPPED | OUTPATIENT
Start: 2019-10-15 | End: 2020-08-04

## 2019-10-15 NOTE — TELEPHONE ENCOUNTER
Refill X 6 months, sent to pharmacy.Pt. Seen in the last 6 months per protocol. Labs scanned in from 1/19 and WNL.

## 2019-10-15 NOTE — TELEPHONE ENCOUNTER
Was the patient seen in the last year in this department? Yes    Does patient have an active prescription for medications requested? No     Received Request Via: Pharmacy      Pt met protocol?: Yes    OV 3/19

## 2020-01-23 ENCOUNTER — OFFICE VISIT (OUTPATIENT)
Dept: URGENT CARE | Facility: PHYSICIAN GROUP | Age: 65
End: 2020-01-23
Payer: MEDICARE

## 2020-01-23 ENCOUNTER — HOSPITAL ENCOUNTER (OUTPATIENT)
Dept: RADIOLOGY | Facility: MEDICAL CENTER | Age: 65
End: 2020-01-23
Attending: PHYSICIAN ASSISTANT
Payer: MEDICARE

## 2020-01-23 VITALS
RESPIRATION RATE: 16 BRPM | BODY MASS INDEX: 38.58 KG/M2 | WEIGHT: 239 LBS | SYSTOLIC BLOOD PRESSURE: 138 MMHG | HEART RATE: 86 BPM | OXYGEN SATURATION: 98 % | TEMPERATURE: 97.5 F | DIASTOLIC BLOOD PRESSURE: 76 MMHG

## 2020-01-23 DIAGNOSIS — M54.6 ACUTE RIGHT-SIDED THORACIC BACK PAIN: ICD-10-CM

## 2020-01-23 DIAGNOSIS — R31.9 HEMATURIA, UNSPECIFIED TYPE: ICD-10-CM

## 2020-01-23 LAB
APPEARANCE UR: CLEAR
BILIRUB UR STRIP-MCNC: NEGATIVE MG/DL
COLOR UR AUTO: YELLOW
GLUCOSE UR STRIP.AUTO-MCNC: NEGATIVE MG/DL
KETONES UR STRIP.AUTO-MCNC: NEGATIVE MG/DL
LEUKOCYTE ESTERASE UR QL STRIP.AUTO: NORMAL
NITRITE UR QL STRIP.AUTO: NEGATIVE
PH UR STRIP.AUTO: 5 [PH] (ref 5–8)
PROT UR QL STRIP: NEGATIVE MG/DL
RBC UR QL AUTO: NORMAL
SP GR UR STRIP.AUTO: 1.02
UROBILINOGEN UR STRIP-MCNC: 0.2 MG/DL

## 2020-01-23 PROCEDURE — 99214 OFFICE O/P EST MOD 30 MIN: CPT | Performed by: PHYSICIAN ASSISTANT

## 2020-01-23 PROCEDURE — 76775 US EXAM ABDO BACK WALL LIM: CPT

## 2020-01-23 PROCEDURE — 81002 URINALYSIS NONAUTO W/O SCOPE: CPT | Performed by: PHYSICIAN ASSISTANT

## 2020-01-23 RX ORDER — KETOROLAC TROMETHAMINE 10 MG/1
10 TABLET, FILM COATED ORAL 3 TIMES DAILY PRN
Qty: 15 TAB | Refills: 0 | Status: SHIPPED | OUTPATIENT
Start: 2020-01-23 | End: 2020-02-13 | Stop reason: SDUPTHER

## 2020-01-23 RX ORDER — KETOROLAC TROMETHAMINE 30 MG/ML
30 INJECTION, SOLUTION INTRAMUSCULAR; INTRAVENOUS ONCE
Status: COMPLETED | OUTPATIENT
Start: 2020-01-23 | End: 2020-01-23

## 2020-01-23 RX ADMIN — KETOROLAC TROMETHAMINE 30 MG: 30 INJECTION, SOLUTION INTRAMUSCULAR; INTRAVENOUS at 14:15

## 2020-01-23 ASSESSMENT — ENCOUNTER SYMPTOMS
NUMBNESS: 0
TINGLING: 0
BACK PAIN: 1
NECK PAIN: 0
BOWEL INCONTINENCE: 0
ABDOMINAL PAIN: 0
FLANK PAIN: 1
LEG PAIN: 0
FALLS: 0
MYALGIAS: 0
PERIANAL NUMBNESS: 0

## 2020-01-23 NOTE — PROGRESS NOTES
"Subjective:      Rica Barrera is a 64 y.o. female who presents with Side Pain (middle upper side pain x2 days (R) )            Patient is a pleasant 64-year-old who presents to urgent care with right sided upper back pain for the last 2 days.  Patient admits that she thought it was may be musculoskeletal in nature however patient will have the pain even just sitting still.  She does report taking Aleve with mild improvement of symptoms however pain is still \"intense \".  Patient denies any vomiting, diarrhea, or any urinary symptoms.  She also denies history of renal stones.  She denies any new cough or recent illness.  She does report that the pain radiates to her right flank however denies any abdominal pain.  She also denies any worsening pain with food or eating.  She denies specific fall, trauma or injury.  She does report picking up her grandchild of which this is the only new movement however this is not new and patient has been watching over her for some time as patient's granddaughter is 4 months old.  Patient does report intermittent back pain in the past however \"not like this \".    Back Pain   This is a new problem. Episode onset: 2 days ago. The pain is present in the thoracic spine. The quality of the pain is described as aching and stabbing. The pain is at a severity of 7/10. The pain is moderate. Pertinent negatives include no abdominal pain, bladder incontinence, bowel incontinence, chest pain, dysuria, leg pain, numbness, perianal numbness or tingling. Risk factors include lack of exercise. She has tried NSAIDs for the symptoms. The treatment provided mild relief.       Review of Systems   Cardiovascular: Negative for chest pain.   Gastrointestinal: Negative for abdominal pain and bowel incontinence.   Genitourinary: Positive for flank pain. Negative for bladder incontinence, dysuria, frequency, hematuria and urgency.   Musculoskeletal: Positive for back pain. Negative for falls, joint pain, myalgias " and neck pain.   Neurological: Negative for tingling and numbness.   All other systems reviewed and are negative.         Objective:     /76   Pulse 86   Temp 36.4 °C (97.5 °F)   Resp 16   Wt 108.4 kg (239 lb)   SpO2 98%   BMI 38.58 kg/m²    PMH:  has a past medical history of Hyperlipidemia. She also has no past medical history of Clotting disorder (HCC), Heart attack (HCC), Heart murmur, Seizure (HCC), or Stroke (HCC).  MEDS: Reviewed .   ALLERGIES: No Known Allergies  SURGHX:   Past Surgical History:   Procedure Laterality Date   • PRIMARY C SECTION     • VEIN STRIPPING       SOCHX:  reports that she has been smoking cigarettes. She has been smoking about 0.50 packs per day. She has never used smokeless tobacco. She reports that she does not drink alcohol or use drugs.  FH: Family history was reviewed, no pertinent findings to report    Physical Exam  Vitals signs reviewed.   Constitutional:       General: She is not in acute distress.     Appearance: She is well-developed.   HENT:      Head: Normocephalic and atraumatic.   Eyes:      Conjunctiva/sclera: Conjunctivae normal.      Pupils: Pupils are equal, round, and reactive to light.   Neck:      Musculoskeletal: Normal range of motion and neck supple.      Trachea: No tracheal deviation.   Cardiovascular:      Rate and Rhythm: Normal rate.   Pulmonary:      Effort: Pulmonary effort is normal. No respiratory distress.      Breath sounds: Normal breath sounds.   Abdominal:      Comments: Abdomen nontender specifically negative Christensen's or tenderness to the right upper quadrant.  Negative heeltap.   Musculoskeletal:      Thoracic back: She exhibits tenderness. She exhibits normal range of motion, no bony tenderness, no swelling, no spasm and normal pulse.        Back:       Comments: Without any midline spinal tenderness, skin is normal without noted rash.   Skin:     General: Skin is warm.      Findings: No rash.   Neurological:      Mental Status:  She is alert and oriented to person, place, and time.      Coordination: Coordination normal.   Psychiatric:         Behavior: Behavior normal.         Thought Content: Thought content normal.         Judgment: Judgment normal.               UA- moderate blood, trace LE.     Assessment/Plan:       1. Acute right-sided thoracic back pain  - POCT Urinalysis  - US-RENAL; Future  - ketorolac (TORADOL) injection 30 mg    2. Hematuria, unspecified type  - US-RENAL; Future  - ketorolac (TORADOL) injection 30 mg    Due to location, without recent trauma or injury, and noted blood in her urine-we will order the above ultrasound for further investigation of stone.  Currently abdomen is nontender and unlikely gallbladder etiology-could be musculoskeletal in nature although with hematuria I do feel that further imaging is warranted.   Patient is agreeable.  Provided the above injection-patient is to avoid NSAIDs the rest of the day.  I will follow-up with this patient as results return.    Patient and/or guardian given precautionary s/sx that mandate immediate follow up and evaluation in the ED. Advised of risks of not doing so.  Side effects of the above medications discussed.   DDX, Supportive care, and indications for immediate follow-up discussed with patient.    Instructed to return to clinic or nearest emergency department if we are not available for any change in condition, further concerns, or worsening of symptoms.    The patient and/or guardian demonstrated a good understanding and agreed with the treatment plan.    Please note that this dictation was created using voice recognition software. I have made every reasonable attempt to correct obvious errors, but I expect that there are errors of grammar and possibly content that I did not discover before finalizing the note.    1/23: 3569-called and left message for this patient regarding normal ultrasound results.  Still concern for hematuria and strongly encourage  patient to follow-up with her PCP for reevaluation of hematuria.  Furthermore discussed that this may be musculoskeletal however very strict precautions were given to include abdominal pain worsening cough or shortness of breath etc.      She is to have recheck in a few days if minimal improvement will send Toradol to her pharmacy she is to avoid concomitant NSAID usage with such.  My contact information was given.

## 2020-01-24 ENCOUNTER — TELEPHONE (OUTPATIENT)
Dept: URGENT CARE | Facility: PHYSICIAN GROUP | Age: 65
End: 2020-01-24

## 2020-01-24 ENCOUNTER — OFFICE VISIT (OUTPATIENT)
Dept: URGENT CARE | Facility: PHYSICIAN GROUP | Age: 65
End: 2020-01-24
Payer: MEDICARE

## 2020-01-24 VITALS
DIASTOLIC BLOOD PRESSURE: 90 MMHG | HEART RATE: 86 BPM | OXYGEN SATURATION: 96 % | BODY MASS INDEX: 40.66 KG/M2 | TEMPERATURE: 98 F | SYSTOLIC BLOOD PRESSURE: 130 MMHG | WEIGHT: 253 LBS | HEIGHT: 66 IN | RESPIRATION RATE: 16 BRPM

## 2020-01-24 DIAGNOSIS — R10.11 RUQ ABDOMINAL PAIN: Primary | ICD-10-CM

## 2020-01-24 DIAGNOSIS — Z87.448 HISTORY OF HEMATURIA: ICD-10-CM

## 2020-01-24 LAB
APPEARANCE UR: CLEAR
BILIRUB UR STRIP-MCNC: NORMAL MG/DL
COLOR UR AUTO: YELLOW
GLUCOSE UR STRIP.AUTO-MCNC: NORMAL MG/DL
KETONES UR STRIP.AUTO-MCNC: NORMAL MG/DL
LEUKOCYTE ESTERASE UR QL STRIP.AUTO: NORMAL
NITRITE UR QL STRIP.AUTO: NORMAL
PH UR STRIP.AUTO: 5.5 [PH] (ref 5–8)
PROT UR QL STRIP: NORMAL MG/DL
RBC UR QL AUTO: NORMAL
SP GR UR STRIP.AUTO: 1.01
UROBILINOGEN UR STRIP-MCNC: 0.2 MG/DL

## 2020-01-24 PROCEDURE — 81002 URINALYSIS NONAUTO W/O SCOPE: CPT | Performed by: NURSE PRACTITIONER

## 2020-01-24 PROCEDURE — 99214 OFFICE O/P EST MOD 30 MIN: CPT | Performed by: NURSE PRACTITIONER

## 2020-01-24 ASSESSMENT — ENCOUNTER SYMPTOMS
DIZZINESS: 0
HEARTBURN: 0
CHILLS: 0
CONSTIPATION: 0
HEADACHES: 0
FEVER: 0
ABDOMINAL PAIN: 1
NAUSEA: 0
COUGH: 0
DIARRHEA: 0
BLOOD IN STOOL: 0
VOMITING: 0

## 2020-01-25 ENCOUNTER — HOSPITAL ENCOUNTER (EMERGENCY)
Facility: MEDICAL CENTER | Age: 65
End: 2020-01-25
Attending: EMERGENCY MEDICINE
Payer: MEDICARE

## 2020-01-25 ENCOUNTER — APPOINTMENT (OUTPATIENT)
Dept: RADIOLOGY | Facility: MEDICAL CENTER | Age: 65
End: 2020-01-25
Attending: EMERGENCY MEDICINE
Payer: MEDICARE

## 2020-01-25 VITALS
OXYGEN SATURATION: 93 % | BODY MASS INDEX: 40.85 KG/M2 | TEMPERATURE: 97.1 F | SYSTOLIC BLOOD PRESSURE: 122 MMHG | DIASTOLIC BLOOD PRESSURE: 60 MMHG | HEIGHT: 66 IN | WEIGHT: 254.19 LBS | HEART RATE: 60 BPM | RESPIRATION RATE: 18 BRPM

## 2020-01-25 DIAGNOSIS — K80.50 BILIARY COLIC: ICD-10-CM

## 2020-01-25 LAB
ALBUMIN SERPL BCP-MCNC: 4.3 G/DL (ref 3.2–4.9)
ALBUMIN/GLOB SERPL: 1.3 G/DL
ALP SERPL-CCNC: 79 U/L (ref 30–99)
ALT SERPL-CCNC: 14 U/L (ref 2–50)
ANION GAP SERPL CALC-SCNC: 10 MMOL/L (ref 0–11.9)
APPEARANCE UR: CLEAR
AST SERPL-CCNC: 15 U/L (ref 12–45)
BACTERIA #/AREA URNS HPF: ABNORMAL /HPF
BASOPHILS # BLD AUTO: 0.4 % (ref 0–1.8)
BASOPHILS # BLD: 0.03 K/UL (ref 0–0.12)
BILIRUB SERPL-MCNC: 0.4 MG/DL (ref 0.1–1.5)
BILIRUB UR QL STRIP.AUTO: NEGATIVE
BUN SERPL-MCNC: 16 MG/DL (ref 8–22)
CALCIUM SERPL-MCNC: 9.7 MG/DL (ref 8.5–10.5)
CHLORIDE SERPL-SCNC: 104 MMOL/L (ref 96–112)
CO2 SERPL-SCNC: 27 MMOL/L (ref 20–33)
COLOR UR: YELLOW
CREAT SERPL-MCNC: 1.06 MG/DL (ref 0.5–1.4)
EKG IMPRESSION: NORMAL
EOSINOPHIL # BLD AUTO: 0.13 K/UL (ref 0–0.51)
EOSINOPHIL NFR BLD: 1.7 % (ref 0–6.9)
EPI CELLS #/AREA URNS HPF: ABNORMAL /HPF
ERYTHROCYTE [DISTWIDTH] IN BLOOD BY AUTOMATED COUNT: 43 FL (ref 35.9–50)
GLOBULIN SER CALC-MCNC: 3.3 G/DL (ref 1.9–3.5)
GLUCOSE SERPL-MCNC: 136 MG/DL (ref 65–99)
GLUCOSE UR STRIP.AUTO-MCNC: NEGATIVE MG/DL
HCT VFR BLD AUTO: 45.3 % (ref 37–47)
HGB BLD-MCNC: 15.3 G/DL (ref 12–16)
HYALINE CASTS #/AREA URNS LPF: ABNORMAL /LPF
IMM GRANULOCYTES # BLD AUTO: 0.01 K/UL (ref 0–0.11)
IMM GRANULOCYTES NFR BLD AUTO: 0.1 % (ref 0–0.9)
KETONES UR STRIP.AUTO-MCNC: NEGATIVE MG/DL
LEUKOCYTE ESTERASE UR QL STRIP.AUTO: ABNORMAL
LIPASE SERPL-CCNC: 8 U/L (ref 11–82)
LYMPHOCYTES # BLD AUTO: 1.16 K/UL (ref 1–4.8)
LYMPHOCYTES NFR BLD: 15.4 % (ref 22–41)
MCH RBC QN AUTO: 31 PG (ref 27–33)
MCHC RBC AUTO-ENTMCNC: 33.8 G/DL (ref 33.6–35)
MCV RBC AUTO: 91.9 FL (ref 81.4–97.8)
MICRO URNS: ABNORMAL
MONOCYTES # BLD AUTO: 0.47 K/UL (ref 0–0.85)
MONOCYTES NFR BLD AUTO: 6.3 % (ref 0–13.4)
NEUTROPHILS # BLD AUTO: 5.72 K/UL (ref 2–7.15)
NEUTROPHILS NFR BLD: 76.1 % (ref 44–72)
NITRITE UR QL STRIP.AUTO: NEGATIVE
NRBC # BLD AUTO: 0 K/UL
NRBC BLD-RTO: 0 /100 WBC
NT-PROBNP SERPL IA-MCNC: 296 PG/ML (ref 0–125)
PH UR STRIP.AUTO: 6.5 [PH] (ref 5–8)
PLATELET # BLD AUTO: 248 K/UL (ref 164–446)
PMV BLD AUTO: 9.3 FL (ref 9–12.9)
POTASSIUM SERPL-SCNC: 4.1 MMOL/L (ref 3.6–5.5)
PROT SERPL-MCNC: 7.6 G/DL (ref 6–8.2)
PROT UR QL STRIP: NEGATIVE MG/DL
RBC # BLD AUTO: 4.93 M/UL (ref 4.2–5.4)
RBC # URNS HPF: ABNORMAL /HPF
RBC UR QL AUTO: ABNORMAL
SODIUM SERPL-SCNC: 141 MMOL/L (ref 135–145)
SP GR UR STRIP.AUTO: 1.01
TROPONIN T SERPL-MCNC: 6 NG/L (ref 6–19)
UROBILINOGEN UR STRIP.AUTO-MCNC: 0.2 MG/DL
WBC # BLD AUTO: 7.5 K/UL (ref 4.8–10.8)
WBC #/AREA URNS HPF: ABNORMAL /HPF

## 2020-01-25 PROCEDURE — 83690 ASSAY OF LIPASE: CPT

## 2020-01-25 PROCEDURE — 84484 ASSAY OF TROPONIN QUANT: CPT

## 2020-01-25 PROCEDURE — 76705 ECHO EXAM OF ABDOMEN: CPT

## 2020-01-25 PROCEDURE — 96374 THER/PROPH/DIAG INJ IV PUSH: CPT

## 2020-01-25 PROCEDURE — 99285 EMERGENCY DEPT VISIT HI MDM: CPT

## 2020-01-25 PROCEDURE — A9270 NON-COVERED ITEM OR SERVICE: HCPCS | Performed by: EMERGENCY MEDICINE

## 2020-01-25 PROCEDURE — 93005 ELECTROCARDIOGRAM TRACING: CPT | Performed by: EMERGENCY MEDICINE

## 2020-01-25 PROCEDURE — 81001 URINALYSIS AUTO W/SCOPE: CPT

## 2020-01-25 PROCEDURE — 96375 TX/PRO/DX INJ NEW DRUG ADDON: CPT

## 2020-01-25 PROCEDURE — 85025 COMPLETE CBC W/AUTO DIFF WBC: CPT

## 2020-01-25 PROCEDURE — 700102 HCHG RX REV CODE 250 W/ 637 OVERRIDE(OP): Performed by: EMERGENCY MEDICINE

## 2020-01-25 PROCEDURE — 80053 COMPREHEN METABOLIC PANEL: CPT

## 2020-01-25 PROCEDURE — 71045 X-RAY EXAM CHEST 1 VIEW: CPT

## 2020-01-25 PROCEDURE — 83880 ASSAY OF NATRIURETIC PEPTIDE: CPT

## 2020-01-25 PROCEDURE — 304561 HCHG STAT O2

## 2020-01-25 PROCEDURE — 36415 COLL VENOUS BLD VENIPUNCTURE: CPT

## 2020-01-25 PROCEDURE — 700111 HCHG RX REV CODE 636 W/ 250 OVERRIDE (IP): Performed by: EMERGENCY MEDICINE

## 2020-01-25 RX ORDER — MORPHINE SULFATE 4 MG/ML
4 INJECTION, SOLUTION INTRAMUSCULAR; INTRAVENOUS ONCE
Status: COMPLETED | OUTPATIENT
Start: 2020-01-25 | End: 2020-01-25

## 2020-01-25 RX ORDER — ONDANSETRON 2 MG/ML
4 INJECTION INTRAMUSCULAR; INTRAVENOUS ONCE
Status: COMPLETED | OUTPATIENT
Start: 2020-01-25 | End: 2020-01-25

## 2020-01-25 RX ORDER — ONDANSETRON 4 MG/1
4 TABLET, ORALLY DISINTEGRATING ORAL EVERY 6 HOURS PRN
Qty: 10 TAB | Refills: 0 | Status: SHIPPED | OUTPATIENT
Start: 2020-01-25 | End: 2020-02-10

## 2020-01-25 RX ORDER — HYDROCODONE BITARTRATE AND ACETAMINOPHEN 5; 325 MG/1; MG/1
1-2 TABLET ORAL EVERY 4 HOURS PRN
Qty: 15 TAB | Refills: 0 | Status: SHIPPED | OUTPATIENT
Start: 2020-01-25 | End: 2020-01-28

## 2020-01-25 RX ORDER — HYDROCODONE BITARTRATE AND ACETAMINOPHEN 5; 325 MG/1; MG/1
1 TABLET ORAL ONCE
Status: COMPLETED | OUTPATIENT
Start: 2020-01-25 | End: 2020-01-25

## 2020-01-25 RX ADMIN — ONDANSETRON 4 MG: 2 INJECTION INTRAMUSCULAR; INTRAVENOUS at 08:30

## 2020-01-25 RX ADMIN — HYDROCODONE BITARTRATE AND ACETAMINOPHEN 1 TABLET: 5; 325 TABLET ORAL at 12:05

## 2020-01-25 RX ADMIN — MORPHINE SULFATE 4 MG: 4 INJECTION INTRAVENOUS at 08:30

## 2020-01-25 ASSESSMENT — LIFESTYLE VARIABLES: DO YOU DRINK ALCOHOL: NO

## 2020-01-25 NOTE — PROGRESS NOTES
"Subjective:      Rica Barrera is a 64 y.o. female who presents with RUQ Pain (Pt seen for this recently, States her Sx are worsening)    Reviewed past medical, surgical and family history. Reviewed prescription and OTC medications with patient in electronic health record today      No Known Allergies          HPI This is a new problem.  C/o RUQ abd pain. Pain 8-9 /10. Constant pain, achy. Does not change with eating.  Taking toradol PO which seems to help reduce discomfort. \" The pain is getting much worse\". Pain increased with movement, deep breathing. It is constant. Woke her up last night.  Occ. The pain is unprovoked. Pain will radiate to her back occasionally. Denies unusual activity or trauma.     Review of Systems   Constitutional: Negative for chills, fever and malaise/fatigue.   Respiratory: Negative for cough.    Gastrointestinal: Positive for abdominal pain. Negative for blood in stool, constipation, diarrhea, heartburn, nausea and vomiting.   Genitourinary: Negative for dysuria, frequency and urgency.   Neurological: Negative for dizziness and headaches.          Objective:     /90   Pulse 86   Temp 36.7 °C (98 °F) (Temporal)   Resp 16   Ht 1.676 m (5' 6\")   Wt 114.8 kg (253 lb)   SpO2 96%   BMI 40.84 kg/m²      Physical Exam  Vitals signs and nursing note reviewed.   Constitutional:       General: She is in acute distress.      Appearance: Normal appearance. She is well-developed. She is not toxic-appearing.   HENT:      Head: Normocephalic.      Right Ear: Hearing normal.      Left Ear: Hearing normal.      Mouth/Throat:      Pharynx: Uvula midline.   Eyes:      General: Lids are normal.      Pupils: Pupils are equal, round, and reactive to light.   Neck:      Musculoskeletal: Full passive range of motion without pain and normal range of motion.      Trachea: Trachea and phonation normal.   Cardiovascular:      Rate and Rhythm: Normal rate and regular rhythm.   Pulmonary:      Effort: " Pulmonary effort is normal.      Breath sounds: Normal breath sounds.   Abdominal:      Palpations: Abdomen is soft.      Tenderness: There is tenderness in the right upper quadrant. There is no right CVA tenderness, left CVA tenderness, guarding or rebound. Negative signs include Christensen's sign, Rovsing's sign and McBurney's sign.       Lymphadenopathy:      Cervical: No cervical adenopathy.      Upper Body:      Right upper body: No supraclavicular adenopathy.      Left upper body: No supraclavicular adenopathy.   Skin:     General: Skin is warm and dry.      Findings: No rash.   Neurological:      Mental Status: She is alert and oriented to person, place, and time.   Psychiatric:         Speech: Speech normal.         Behavior: Behavior normal. Behavior is cooperative.     Renal ultrasound and urgent care visit reviewed from 0 1-23 2020 01/23/2020  yellow      POC Appearance Negative clear    POC Leukocyte Esterase Negative trace    POC Nitrites Negative negative    POC Urobiligen Negative (0.2) mg/dL 0.2    POC Protein Negative mg/dL negative    POC Urine PH 5.0 - 8.0 5.0    POC Blood Negative moderate    POC Specific Gravity <1.005 - >1.030 1.025    POC Ketones Negative mg/dL negative    POC Bilirubin Negative mg/dL negative    POC Glucose Negative mg/dL negative      Results for orders placed or performed in visit on 01/23/20   POCT Urinalysis   Result Value Ref Range    POC Color yellow Negative    POC Appearance clear Negative    POC Leukocyte Esterase trace Negative    POC Nitrites negative Negative    POC Urobiligen 0.2 Negative (0.2) mg/dL    POC Protein negative Negative mg/dL    POC Urine PH 5.0 5.0 - 8.0    POC Blood moderate Negative    POC Specific Gravity 1.025 <1.005 - >1.030    POC Ketones negative Negative mg/dL    POC Bilirubin negative Negative mg/dL    POC Glucose negative Negative mg/dL          Assessment/Plan:     1. RUQ abdominal pain     2. History of hematuria  POCT Urinalysis        To ER for further evaluation management of her right upper quadrant abdominal pain.  She declines ambulance transport and will travel POV.  Her vital signs are stable.  Patient agrees to this plan of care.  Advised her to remain n.p.o. until evaluated by ERP

## 2020-01-25 NOTE — DISCHARGE INSTRUCTIONS
You have slight pulmonary edema and will need further follow-up with your primary care physician for outpatient evaluation.  Have scheduled you an appointment with cardiology for further evaluation and management.  Abdominal Pain, Extended Version   Belly Pain, Stomach Pain    Take clear fluid diet 12 hours and slowly advance to solid food as tolerated. Take  Ibuprofen or Tylenol for pain as needed. Return to the emergency department in 24 hours for reevaluation if you continue to have abdominal. Return sooner if you have worsening pain (especially in the lower right abdomen), pain that is worse with movement, uncontrolled vomiting, new fever, bleeding or ill appearance.    Your exam may not have shown the exact reason you have abdominal pain.  Since there are many different causes of abdominal pain, another checkup and more tests is required in 24 hours if your symptoms do not improve. One of the many possible causes of abdominal pain in any person who has not had their appendix removed is Acute Appendicitis.  Appendicitis is often a very difficult to diagnosis. Normal blood tests, urine tests, and even ultrasound and CT can not ensure there is not an early appendicitis. Sometimes only the changes which occur over time will allow appendicitis and other causes of abdominal pain to be determined.  Because of this, it is important you follow all of the instructions below.                                                                                                HOME CARE INSTRUCTIONS  Rest.  Do not eat solid food until your pain is gone.  While You Have Pain:  Stay on a clear liquid diet.  A clear liquid is one you can see through (water, weak tea, broth or bouillon, ginger ale, Jell-o, Mik-Aid, Gatorade, apple juice, popsicles or ice chips).   When Your Pain is Gone:  Start a light diet (dry toast, crackers, applesauce, white rice, bananas, broth or bouillon) and increase the diet slowly, as long as it does not  bother you.  No dairy products (including cheese and eggs) and no spicy, fatty, fried or high fiber foods.  No alcohol, caffeine or cigarettes.  Take your regular medicines unless the caregiver told you not to.  Take any prescribed medicine as directed.  Do not take medicine containing aspirin, ibuprofen (Advil® / Motrin® ), naprosyn/naproxen (Aleve®) or ketoprofen (Orudis® ) unless told to by your own caregiver.    SEEK IMMEDIATE MEDICAL ATTENTION IF :  Your pain is not gone in 8-12 hours.  Your pain becomes worse, changes location or feels different.  You have a fever or shaking chills.  You keep throwing up or cannot drink liquids.   You see blood when you throw up or see blood in your bowel movements.    Your bowel movements become dark or black.  You move your bowels frequently.  Your bowel movements stop (become blocked) or you cannot pass gas.  You have bloody, frequent or painful urination (“passing water”).  Your skin or the whites of your eyes look yellow.  Your stomach becomes bloated or bigger.  You notice bleeding or discharge from your vagina.  You have dizziness or fainting.  You have chest or back pain.   Anything else worries you.  You become short of breath.    If you have questions or concerns, please call your caregiver.     Adapted from ©2001  [employer] Aftercare Instruction Sheets  Last reviewed July 12, 2005, [employer], creator of [employer] Patient Information System.    [employer]® Patient Information ©2007 [employer].

## 2020-01-25 NOTE — ED NOTES
[place] resting [place].  O2 removed by ERP.  Pt 93% on RA.  Awaiting schedulers appt in epic for discharge.

## 2020-01-25 NOTE — ED NOTES
Pain improved since oral pain medication.  Discharge instructions given.  All questions answered.  Prescriptions given x2.  Pt to follow-up with PCP, Cardiologist, and GI, return to ER if symptoms worsen as discussed.  Pt verbalized understanding.  All belongings with pt.  Pt ambulated to lobby.

## 2020-01-25 NOTE — ED TRIAGE NOTES
".  Chief Complaint   Patient presents with   • Sent from Urgent Care     seen in [place] yesterday   • Abdominal Pain     RUQ pain that started 4-5 days ago     .BP (!) 161/98   Pulse 77   Temp 36.2 °C (97.1 °F) (Oral)   Resp 20   Ht 1.676 m (5' 6\")   Wt 115.3 kg (254 lb 3.1 oz)   SpO2 93%   BMI 41.03 kg/m²     Ambulatory to triage with above complaints, given urine specimen cup and clean catch instructions, placed in lobby with daughter, called for EKG.    "

## 2020-01-25 NOTE — ED NOTES
Pt reports that pain returning, ERP informed will medication with oral pain medication and monitor prior to discharge.

## 2020-01-25 NOTE — ED PROVIDER NOTES
ED Provider Note    Scribed for Jian Alves D.O. by Curtis Baker. 1/25/2020  8:23 AM    Primary care provider: Maria Isabel Hassan M.D.  Means of arrival: Walk-in  History obtained from: Patient  History limited by: None    CHIEF COMPLAINT  Chief Complaint   Patient presents with   • Sent from Urgent Care     seen in [place] yesterday   • Abdominal Pain     RUQ pain that started 4-5 days ago       HPI  Rica Barrera is a 64 y.o. female who presents to the Emergency Department complaining of worsening right upper quadrant abdominal pain onset 3-4 days ago. Patient states her pain started in her back and moved to her abdomen. Her pain still radiates to her back. Yesterday, she presented to urgent care in [place], [place], and she had an ultra sound of her liver and bladder at [facility] prior to arrival today. She took one dose of Toradol 2 hours prior to my exam, but she denies any modifying factors. The patient endorses associated vomiting (1 episode today) but denies any fever or chest pain. She denies any history of ulcers.     REVIEW OF SYSTEMS  Pertinent positives include abdominal pain and vomiting. Pertinent negatives include no fever or chest pain.  All other systems reviewed and negative.    PAST MEDICAL HISTORY  Past Medical History:   Diagnosis Date   • Hyperlipidemia        SURGICAL HISTORY  Past Surgical History:   Procedure Laterality Date   • PRIMARY C SECTION     • VEIN STRIPPING          SOCIAL HISTORY  Social History     Tobacco Use   • Smoking status: Current Every Day Smoker     Packs/day: 0.50     Types: Cigarettes   • Smokeless tobacco: Never Used   Substance Use Topics   • Alcohol use: No     Alcohol/week: 0.0 oz   • Drug use: No      Social History     Substance and Sexual Activity   Drug Use No       FAMILY HISTORY  Family History   Problem Relation Age of Onset   • Heart Disease Mother    • Hypertension Mother    • Dementia Father    • Cancer Father         spine   • Diabetes Father    •  "Cancer Sister 41        breast   • Cancer Maternal Grandmother         leukemia       CURRENT MEDICATIONS  No current facility-administered medications on file prior to encounter.      Current Outpatient Medications on File Prior to Encounter   Medication Sig Dispense Refill   • ketorolac (TORADOL) 10 MG Tab Take 1 Tab by mouth 3 times a day as needed for Severe Pain for up to 5 days. 15 Tab 0   • metoprolol SR (TOPROL XL) 25 MG TABLET SR 24 HR TAKE 1/2 (ONE-HALF) TABLET BY MOUTH ONCE DAILY 45 Tab 1   • Misc Natural Products (TURMERIC CURCUMIN) Cap Take  by mouth.     • lovastatin (MEVACOR) 40 MG tablet TAKE ONE TABLET BY MOUTH ONCE DAILY 90 Tab 3   • Cholecalciferol (VITAMIN D3) 2000 UNIT Cap Take  by mouth.     • Potassium (POTASSIMIN PO) Take  by mouth.     • Black Pepper-Turmeric (TURMERIC COMPLEX/BLACK PEPPER PO) Take  by mouth.     • MAGNESIUM PO Take  by mouth.     • B Complex Vitamins (VITAMIN B COMPLEX PO) Take  by mouth.     • aspirin 81 MG tablet Take 162 mg by mouth every day.     • Coenzyme Q10 (CO Q 10 PO) Take  by mouth every day.         ALLERGIES  No Known Allergies    PHYSICAL EXAM  VITAL SIGNS: BP (!) 161/98   Pulse 77   Temp 36.2 °C (97.1 °F) (Oral)   Resp 20   Ht 1.676 m (5' 6\")   Wt 115.3 kg (254 lb 3.1 oz)   SpO2 93%   BMI 41.03 kg/m²     Nursing notes and vitals reviewed.  Constitutional: Well developed, Well nourished, No acute distress, Non-toxic appearance.   Eyes: PERRLA, EOMI, Conjunctiva normal, No discharge.   Cardiovascular: Normal heart rate, Normal rhythm, No murmurs, No rubs, No gallops.   Thorax & Lungs: No respiratory distress, No rales, No rhonchi, No wheezing, No chest tenderness.   Abdomen: Bowel sounds normal, Soft, Right upper quadrant tenderness with guarding and rebound, No masses, No pulsatile masses, Positive Christensen's sign, Thrust normal.   Skin: Warm, Dry, No erythema, No rash.   Musculoskeletal: Intact distal pulses, No edema, No cyanosis, No clubbing. Good " range of motion in all major joints. No tenderness to palpation or major deformities noted, no CVA tenderness, no midline back tenderness.   Neurologic: Alert & oriented x 3, Normal motor function, Normal sensory function, No focal deficits noted.  Psychiatric: Affect normal for clinical presentation.    DIAGNOSTIC STUDIES/PROCEDURES    LABS  Results for orders placed or performed during the hospital encounter of 01/25/20   CBC WITH DIFFERENTIAL   Result Value Ref Range    WBC 7.5 4.8 - 10.8 K/uL    RBC 4.93 4.20 - 5.40 M/uL    Hemoglobin 15.3 12.0 - 16.0 g/dL    Hematocrit 45.3 37.0 - 47.0 %    MCV 91.9 81.4 - 97.8 fL    MCH 31.0 27.0 - 33.0 pg    MCHC 33.8 33.6 - 35.0 g/dL    RDW 43.0 35.9 - 50.0 fL    Platelet Count 248 164 - 446 K/uL    MPV 9.3 9.0 - 12.9 fL    Neutrophils-Polys 76.10 (H) 44.00 - 72.00 %    Lymphocytes 15.40 (L) 22.00 - 41.00 %    Monocytes 6.30 0.00 - 13.40 %    Eosinophils 1.70 0.00 - 6.90 %    Basophils 0.40 0.00 - 1.80 %    Immature Granulocytes 0.10 0.00 - 0.90 %    Nucleated RBC 0.00 /100 WBC    Neutrophils (Absolute) 5.72 2.00 - 7.15 K/uL    Lymphs (Absolute) 1.16 1.00 - 4.80 K/uL    Monos (Absolute) 0.47 0.00 - 0.85 K/uL    Eos (Absolute) 0.13 0.00 - 0.51 K/uL    Baso (Absolute) 0.03 0.00 - 0.12 K/uL    Immature Granulocytes (abs) 0.01 0.00 - 0.11 K/uL    NRBC (Absolute) 0.00 K/uL   COMP METABOLIC PANEL   Result Value Ref Range    Sodium 141 135 - 145 mmol/L    Potassium 4.1 3.6 - 5.5 mmol/L    Chloride 104 96 - 112 mmol/L    Co2 27 20 - 33 mmol/L    Anion Gap 10.0 0.0 - 11.9    Glucose 136 (H) 65 - 99 mg/dL    Bun 16 8 - 22 mg/dL    Creatinine 1.06 0.50 - 1.40 mg/dL    Calcium 9.7 8.5 - 10.5 mg/dL    AST(SGOT) 15 12 - 45 U/L    ALT(SGPT) 14 2 - 50 U/L    Alkaline Phosphatase 79 30 - 99 U/L    Total Bilirubin 0.4 0.1 - 1.5 mg/dL    Albumin 4.3 3.2 - 4.9 g/dL    Total Protein 7.6 6.0 - 8.2 g/dL    Globulin 3.3 1.9 - 3.5 g/dL    A-G Ratio 1.3 g/dL   LIPASE   Result Value Ref Range     Lipase 8 (L) 11 - 82 U/L   URINALYSIS,CULTURE IF INDICATED   Result Value Ref Range    Color Yellow     Character Clear     Specific Gravity 1.014 <1.035    Ph 6.5 5.0 - 8.0    Glucose Negative Negative mg/dL    Ketones Negative Negative mg/dL    Protein Negative Negative mg/dL    Bilirubin Negative Negative    Urobilinogen, Urine 0.2 Negative    Nitrite Negative Negative    Leukocyte Esterase Trace (A) Negative    Occult Blood Trace (A) Negative    Micro Urine Req Microscopic    ESTIMATED GFR   Result Value Ref Range    GFR If African American >60 >60 mL/min/1.73 m 2    GFR If Non African American 52 (A) >60 mL/min/1.73 m 2   TROPONIN   Result Value Ref Range    Troponin T 6 6 - 19 ng/L   URINE MICROSCOPIC (W/UA)   Result Value Ref Range    WBC 0-2 /hpf    RBC 2-5 (A) /hpf    Bacteria Few (A) None /hpf    Epithelial Cells Few /hpf    Hyaline Cast 0-2 /lpf   proBrain Natriuretic Peptide, NT   Result Value Ref Range    NT-proBNP 296 (H) 0 - 125 pg/mL   EKG   Result Value Ref Range    Report       [facility] [place] Emergency Dept.    Test Date:  2020  Pt Name:    MUKESH LAI                    Department: ER  MRN:        [id number]                      Room:  Gender:     Female                       Technician: [id number]  :        1955                   Requested By:ER TRIAGE PROTOCOL  Order #:    [id number]                    Coral MD: YOLY KUO DO    Measurements  Intervals                                Axis  Rate:       67                           P:          25  MN:         154                          QRS:        -44  QRSD:       102                          T:          21  QT:         390  QTc:        412    Interpretive Statements  Sinus rhythm  Incomplete RBBB and LAFB  RSR' in V1 or V2, right VCD or RVH  No previous ECG available for comparison  Electronically Signed On 2020 11:47:09 PST by YOLY KUO DO       All labs reviewed by  "me.    RADIOLOGY  DX-CHEST-LIMITED (1 VIEW)   Final Result      1.  Pulmonary vascular congestion and probable minimal pulmonary edema.   2.  No pneumonia or pneumothorax.      US-RUQ   Final Result      1.  Possible minimal sludge in the gallbladder.   2.  No evidence for acute cholecystitis or biliary obstruction.           The radiologist's interpretation of all radiological studies have been reviewed by me.    COURSE & MEDICAL DECISION MAKING  Pertinent Labs & Imaging studies reviewed. (See chart for details)    8:23 AM - Patient seen and examined at bedside. Discussed with patient I will order a ultra sound of her gall bladder and labs to evaluate. I will also give her pain and nausea medication. Patient consents to plan of care. Patient will be treated with Morphine IV 4 mg and Zofrain IV 4 mg. Ordered US- RUQ, CBC with differential, CMP, Lipase, EKG and UA to evaluate her symptoms.     10:10 AM - Ordered DX-Chest Troponin and EKG.     10:57 AM - Ordered NT.    11:44 AM - Discussed plan of discharge as outlined below and patient was given the opportunity to ask questions. Patient understands and is comfortable with plan.    12:20 PM - Patient was reevaluated at bedside. Patient states feeling better and is comfortable with going home. Discharge vitals: /60   Pulse 60   Temp 36.2 °C (97.1 °F) (Oral)   Resp 18   Ht 1.676 m (5' 6\")   Wt 115.3 kg (254 lb 3.1 oz)   SpO2 93%   BMI 41.03 kg/m²     This is a charming 64 y.o. female that presents with right upper quadrant abdominal pain.  Here in examination she has required tenderness suspicious for gallbladder etiology.  The patient has no evidence of cholecystitis, cholelithiasis although she has biliary sludge.  She has no evidence of significant increase in LFTs I do not believe she has a choledocholithiasis.  In addition she does have a urinary tract infection, she denies dysuria therefore I will not treat her for this.  She was found that she had a " slightly elevated BNP of 296 and x-ray revealed evidence of slight pulmonary edema.  The patient denies any chest pain or shortness of breath.  I have arranged for her for outpatient cardiology appointment and as well as she is to follow with GI consultants for possible HIDA scan for possibly biliary abnormality.  Patient received prescription for pain medication as below and she will follow-up as an outpatient for further aluation for her gallbladder.  In addition, prior to discharge, she states she feels much better, the pain is much controlled with them pain medication she received and is happy to go home and try as an outpatient.    Although at this point I do not believe the patient has an acute intra-abdominal process that requires emergent surgical intervention there is a possibility that the patient is experiencing an early infection that is in evolution that may require further evaluation and possible surgical consultation. Therefore, strict return precautions have been given to return to the emergency department within 24 hours if the patient has any remaining abdominal pain and to return sooner for increasing pain, uncontrolled nausea or vomiting, fevers or change in symptoms. The patient will followup with their primary care physician within 3 days for re-evaluation.      I reviewed prescription monitoring program for patient's narcotic use before prescribing a scheduled drug.The patient will not drink alcohol nor drive with prescribed medications. The patient will return for new or worsening symptoms and is stable at the time of discharge.    The patient is referred to a primary physician for blood pressure management, diabetic screening, and for all other preventative health concerns.    In prescribing controlled substances to this patient, I certify that I have obtained and reviewed the medical history of Rica Barrera. I have also made a good ashly effort to obtain applicable records from other  providers who have treated the patient and records did not demonstrate any increased risk of substance abuse that would prevent me from prescribing controlled substances.     I have conducted a physical exam and documented it. I have reviewed Ms. Barrera’s prescription history as maintained by the [place] Prescription Monitoring Program.     I have assessed the patient’s risk for abuse, dependency, and addiction using the validated Opioid Risk Tool available at https://www.mdcalc.com/ewfvpc-bwfd-xotd-ort-narcotic-abuse.     Given the above, I believe the benefits of controlled substance therapy outweigh the risks. The reasons for prescribing controlled substances include non-narcotic, oral analgesic alternatives have been inadequate for pain control. Accordingly, I have discussed the risk and benefits, treatment plan, and alternative therapies with the patient.       DISPOSITION:  Patient will be discharged home in stable condition.    FOLLOW UP:  Maria Isabel Hassan M.D.  [address] [facility] [facility]  [address]  [place] [id number] [phone]    Schedule an appointment as soon as possible for a visit       [facility]  [address] [facility] [id number] [phone]  Schedule an appointment as soon as possible for a visit       Chiara Manning M.D.  [address] [id number] [phone]    Schedule an appointment as soon as possible for a visit   As needed      OUTPATIENT MEDICATIONS:  New Prescriptions    HYDROCODONE-ACETAMINOPHEN (NORCO) 5-325 MG TAB PER TABLET    Take 1-2 Tabs by mouth every four hours as needed for up to 3 days.    ONDANSETRON (ZOFRAN ODT) 4 MG TABLET DISPERSIBLE    Take 1 Tab by mouth every 6 hours as needed for Nausea.         FINAL IMPRESSION  1. Biliary colic Active         Curtis GLEASON (Scribe), am scribing for, and in the presence of, Jian Alves D.O    Electronically signed by: Curtis Baker (Gurpreetibe), 1/25/2020    Jian GLEASON D.O.  personally performed the services described in this documentation, as scribed by Curtis Baker in my presence, and it is both accurate and complete.    C    The note accurately reflects work and decisions made by me.  Jian Alves D.O.  1/25/2020  2:50 PM

## 2020-02-04 NOTE — DOCUMENTATION QUERY
[facility]                                                                       Query Response Note      PATIENT:               MUKESH LAI  ACCT #:                  [id number]  MRN:                     [id number]  :                      1955  ADMIT DATE:       2020 8:10 AM  DISCH DATE:        2020 12:48 PM  RESPONDING  PROVIDER #:        [id number]           QUERY TEXT:    Your assistance is needed in determining the reason  for  NATRIURETIC PEPTIDE (BNP) that was ordered.  This service is non-covered by the diagnoses documented in the medical record.      Can you please provide an additional diagnosis that describes the sign or symptom that  (prompted/initiated) the  NATRIURETIC PEPTIDE (BNP).    NOTE:  If an appropriate answer is not listed below, please respond with a new note.        The patient's Clinical Indicators include:   NATRIURETIC PEPTIDE (BNP)  Options provided:   -- Other related diagnosis, Please specify diagnosis demonstrating medical necessity for lab/imaging/other test.)   -- Unable to determine      Query created by: Cassidy Bob on 2020 2:50 AM    RESPONSE TEXT:    Other related diagnosis- Pulmonary edema on Chest X-Ray concerning for CHF          Electronically signed by:  YOLY KUO DO 2020 2:23 PM

## 2020-02-10 ENCOUNTER — OFFICE VISIT (OUTPATIENT)
Dept: CARDIOLOGY | Facility: MEDICAL CENTER | Age: 65
End: 2020-02-10
Payer: MEDICARE

## 2020-02-10 VITALS
DIASTOLIC BLOOD PRESSURE: 80 MMHG | WEIGHT: 246.8 LBS | HEIGHT: 66 IN | SYSTOLIC BLOOD PRESSURE: 138 MMHG | OXYGEN SATURATION: 96 % | HEART RATE: 68 BPM | BODY MASS INDEX: 39.66 KG/M2

## 2020-02-10 DIAGNOSIS — Z72.0 TOBACCO ABUSE: Chronic | ICD-10-CM

## 2020-02-10 DIAGNOSIS — I10 ESSENTIAL HYPERTENSION: ICD-10-CM

## 2020-02-10 DIAGNOSIS — I45.10 INCOMPLETE RIGHT BUNDLE BRANCH BLOCK (RBBB): Chronic | ICD-10-CM

## 2020-02-10 DIAGNOSIS — E78.5 DYSLIPIDEMIA: ICD-10-CM

## 2020-02-10 DIAGNOSIS — I48.0 PAF (PAROXYSMAL ATRIAL FIBRILLATION) (HCC): ICD-10-CM

## 2020-02-10 DIAGNOSIS — E66.9 OBESITY (BMI 35.0-39.9 WITHOUT COMORBIDITY): ICD-10-CM

## 2020-02-10 PROCEDURE — 99204 OFFICE O/P NEW MOD 45 MIN: CPT | Mod: 25 | Performed by: INTERNAL MEDICINE

## 2020-02-10 PROCEDURE — 99406 BEHAV CHNG SMOKING 3-10 MIN: CPT | Performed by: INTERNAL MEDICINE

## 2020-02-10 RX ORDER — HYDROCODONE BITARTRATE AND ACETAMINOPHEN 5; 325 MG/1; MG/1
TABLET ORAL
COMMUNITY
Start: 2020-01-30 | End: 2020-03-11

## 2020-02-10 RX ORDER — ATORVASTATIN CALCIUM 20 MG/1
20 TABLET, FILM COATED ORAL DAILY
Qty: 90 TAB | Refills: 3 | Status: SHIPPED | OUTPATIENT
Start: 2020-02-10 | End: 2021-04-29

## 2020-02-10 ASSESSMENT — ENCOUNTER SYMPTOMS
COUGH: 0
ABDOMINAL PAIN: 0
FALLS: 0
SHORTNESS OF BREATH: 0
DIZZINESS: 0
PALPITATIONS: 0
SORE THROAT: 0
CHILLS: 0
WEAKNESS: 0
BRUISES/BLEEDS EASILY: 0
PND: 0
CLAUDICATION: 0
NAUSEA: 0
BLURRED VISION: 0
FOCAL WEAKNESS: 0
FEVER: 0

## 2020-02-10 NOTE — PROGRESS NOTES
Chief Complaint   Patient presents with   • New Patient     [facility] Cardio/Hyperlipemia       Subjective:   Rica Barrera is a 64 y.o. female who presents today in consultation from Dr. Kristine Garcia for evaluation of abnormal BNP she recently presented for right upper quadrant pain to be biliary in nature she had a cardiac evaluation [place] where she was diagnosed with paroxysmal atrial fibrillation possibility for abnormal stress test and when she moved here to [place] she saw Dr. Maharaj and he had a stress test with stress echo which was normal she is also treated for dyslipidemia and maintained on metoprolol and she has been stable for heart health.    Past Medical History:   Diagnosis Date   • Hyperlipidemia    • Incomplete right bundle branch block (RBBB)    • PAF (paroxysmal atrial fibrillation) ([facility]) 1/2014    • Tobacco abuse      Past Surgical History:   Procedure Laterality Date   • PRIMARY C SECTION     • VEIN STRIPPING       Family History   Problem Relation Age of Onset   • Heart Disease Mother    • Hypertension Mother    • Dementia Father    • Cancer Father         spine   • Diabetes Father    • Cancer Sister 41        breast   • Cancer Maternal Grandmother         leukemia   • Arrythmia Brother         WPW     Social History     Socioeconomic History   • Marital status:      Spouse name: Not on file   • Number of children: Not on file   • Years of education: Not on file   • Highest education level: Not on file   Occupational History   • Not on file   Social Needs   • Financial resource strain: Not on file   • Food insecurity:     Worry: Not on file     Inability: Not on file   • Transportation needs:     Medical: Not on file     Non-medical: Not on file   Tobacco Use   • Smoking status: Current Every Day Smoker     Packs/day: 0.50     Types: Cigarettes   • Smokeless tobacco: Never Used   Substance and Sexual Activity   • Alcohol use: No     Alcohol/week: 0.0 oz   • Drug use: No   • Sexual  activity: Yes     Partners: Male     Comment: - Efrain   Lifestyle   • Physical activity:     Days per week: Not on file     Minutes per session: Not on file   • Stress: Not on file   Relationships   • Social connections:     Talks on phone: Not on file     Gets together: Not on file     Attends Yazidism service: Not on file     Active member of club or organization: Not on file     Attends meetings of clubs or organizations: Not on file     Relationship status: Not on file   • Intimate partner violence:     Fear of current or ex partner: Not on file     Emotionally abused: Not on file     Physically abused: Not on file     Forced sexual activity: Not on file   Other Topics Concern   • Not on file   Social History Narrative   • Not on file     No Known Allergies  Outpatient Encounter Medications as of 2/10/2020   Medication Sig Dispense Refill   • HYDROcodone-acetaminophen (NORCO) 5-325 MG Tab per tablet TK 1 T PO  Q 6 H PRN FOR PAIN. TO LAST 7 DAYS     • atorvastatin (LIPITOR) 20 MG Tab Take 1 Tab by mouth every day. 90 Tab 3   • metoprolol SR (TOPROL XL) 25 MG TABLET SR 24 HR TAKE 1/2 (ONE-HALF) TABLET BY MOUTH ONCE DAILY 45 Tab 1   • Misc Natural Products (TURMERIC CURCUMIN) Cap Take  by mouth.     • Cholecalciferol (VITAMIN D3) 2000 UNIT Cap Take  by mouth.     • Potassium (POTASSIMIN PO) Take  by mouth.     • Black Pepper-Turmeric (TURMERIC COMPLEX/BLACK PEPPER PO) Take  by mouth.     • MAGNESIUM PO Take  by mouth.     • B Complex Vitamins (VITAMIN B COMPLEX PO) Take  by mouth.     • aspirin 81 MG tablet Take 162 mg by mouth every day.     • Coenzyme Q10 (CO Q 10 PO) Take  by mouth every day.     • [DISCONTINUED] ondansetron (ZOFRAN ODT) 4 MG TABLET DISPERSIBLE Take 1 Tab by mouth every 6 hours as needed for Nausea. (Patient not taking: Reported on 2/10/2020) 10 Tab 0   • [DISCONTINUED] lovastatin (MEVACOR) 40 MG tablet TAKE ONE TABLET BY MOUTH ONCE DAILY 90 Tab 3     No facility-administered encounter  "medications on file as of 2/10/2020.      Review of Systems   Constitutional: Negative for chills and fever.   HENT: Negative for sore throat.    Eyes: Negative for blurred vision.   Respiratory: Negative for cough and shortness of breath.    Cardiovascular: Negative for chest pain, palpitations, claudication, leg swelling and PND.   Gastrointestinal: Negative for abdominal pain and nausea.   Musculoskeletal: Negative for falls and joint pain.   Skin: Negative for rash.   Neurological: Negative for dizziness, focal weakness and weakness.   Endo/Heme/Allergies: Does not bruise/bleed easily.        Objective:   /80 (BP Location: Left arm, Patient Position: Sitting, BP Cuff Size: Adult)   Pulse 68   Ht 1.676 m (5' 6\")   Wt 111.9 kg (246 lb 12.8 oz)   SpO2 96%   BMI 39.83 kg/m²     Physical Exam   Constitutional: No distress.   HENT:   Mouth/Throat: Oropharynx is clear and moist. No oropharyngeal exudate.   Eyes: No scleral icterus.   Neck: No JVD present.   Cardiovascular: Normal rate and normal heart sounds. Exam reveals no gallop and no friction rub.   No murmur heard.  Pulmonary/Chest: No respiratory distress. She has no wheezes. She has no rales.   Abdominal: Soft. Bowel sounds are normal.   Musculoskeletal:         General: No edema.   Neurological: She is alert.   Skin: No rash noted. She is not diaphoretic.   Psychiatric: She has a normal mood and affect.     We reviewed in person the most recent labs  Recent Results (from the past 4032 hour(s))   POCT Urinalysis    Collection Time: 01/23/20  1:58 PM   Result Value Ref Range    POC Color yellow Negative    POC Appearance clear Negative    POC Leukocyte Esterase trace Negative    POC Nitrites negative Negative    POC Urobiligen 0.2 Negative (0.2) mg/dL    POC Protein negative Negative mg/dL    POC Urine PH 5.0 5.0 - 8.0    POC Blood moderate Negative    POC Specific Gravity 1.025 <1.005 - >1.030    POC Ketones negative Negative mg/dL    POC Bilirubin " negative Negative mg/dL    POC Glucose negative Negative mg/dL   POCT Urinalysis    Collection Time: 20  6:47 PM   Result Value Ref Range    POC Color YELLOW Negative    POC Appearance CLEAR Negative    POC Leukocyte Esterase NEG Negative    POC Nitrites NEG Negative    POC Urobiligen 0.2 Negative (0.2) mg/dL    POC Protein NEG Negative mg/dL    POC Urine PH 5.5 5.0 - 8.0    POC Blood SM Negative    POC Specific Gravity 1.015 <1.005 - >1.030    POC Ketones NEG Negative mg/dL    POC Bilirubin NEG Negative mg/dL    POC Glucose NEG Negative mg/dL   EKG    Collection Time: 20  8:13 AM   Result Value Ref Range    Report       [facility] Emergency Dept.    Test Date:  2020  Pt Name:    MUKESH LAI                    Department: ER  MRN:        [id number]                      Room:  Gender:     Female                       Technician: [id number]  :        [id number]                   Requested By:ER TRIAGE PROTOCOL  Order #:    [id number]                    Reading MD: YOLY KUO DO    Measurements  Intervals                                Axis  Rate:       67                           P:          25  VT:         154                          QRS:        -44  QRSD:       102                          T:          21  QT:         390  QTc:        412    Interpretive Statements  Sinus rhythm  Incomplete RBBB and LAFB  RSR' in V1 or V2, right VCD or RVH  No previous ECG available for comparison  Electronically Signed On 2020 11:47:09 PST by YOLY KUO DO     CBC WITH DIFFERENTIAL    Collection Time: 20  8:25 AM   Result Value Ref Range    WBC 7.5 4.8 - 10.8 K/uL    RBC 4.93 4.20 - 5.40 M/uL    Hemoglobin 15.3 12.0 - 16.0 g/dL    Hematocrit 45.3 37.0 - 47.0 %    MCV 91.9 81.4 - 97.8 fL    MCH 31.0 27.0 - 33.0 pg    MCHC 33.8 33.6 - 35.0 g/dL    RDW 43.0 35.9 - 50.0 fL    Platelet Count 248 164 - 446 K/uL    MPV 9.3 9.0 - 12.9 fL    Neutrophils-Polys 76.10 (H) 44.00 -  72.00 %    Lymphocytes 15.40 (L) 22.00 - 41.00 %    Monocytes 6.30 0.00 - 13.40 %    Eosinophils 1.70 0.00 - 6.90 %    Basophils 0.40 0.00 - 1.80 %    Immature Granulocytes 0.10 0.00 - 0.90 %    Nucleated RBC 0.00 /100 WBC    Neutrophils (Absolute) 5.72 2.00 - 7.15 K/uL    Lymphs (Absolute) 1.16 1.00 - 4.80 K/uL    Monos (Absolute) 0.47 0.00 - 0.85 K/uL    Eos (Absolute) 0.13 0.00 - 0.51 K/uL    Baso (Absolute) 0.03 0.00 - 0.12 K/uL    Immature Granulocytes (abs) 0.01 0.00 - 0.11 K/uL    NRBC (Absolute) 0.00 K/uL   COMP METABOLIC PANEL    Collection Time: 01/25/20  8:25 AM   Result Value Ref Range    Sodium 141 135 - 145 mmol/L    Potassium 4.1 3.6 - 5.5 mmol/L    Chloride 104 96 - 112 mmol/L    Co2 27 20 - 33 mmol/L    Anion Gap 10.0 0.0 - 11.9    Glucose 136 (H) 65 - 99 mg/dL    Bun 16 8 - 22 mg/dL    Creatinine 1.06 0.50 - 1.40 mg/dL    Calcium 9.7 8.5 - 10.5 mg/dL    AST(SGOT) 15 12 - 45 U/L    ALT(SGPT) 14 2 - 50 U/L    Alkaline Phosphatase 79 30 - 99 U/L    Total Bilirubin 0.4 0.1 - 1.5 mg/dL    Albumin 4.3 3.2 - 4.9 g/dL    Total Protein 7.6 6.0 - 8.2 g/dL    Globulin 3.3 1.9 - 3.5 g/dL    A-G Ratio 1.3 g/dL   LIPASE    Collection Time: 01/25/20  8:25 AM   Result Value Ref Range    Lipase 8 (L) 11 - 82 U/L   ESTIMATED GFR    Collection Time: 01/25/20  8:25 AM   Result Value Ref Range    GFR If African American >60 >60 mL/min/1.73 m 2    GFR If Non African American 52 (A) >60 mL/min/1.73 m 2   TROPONIN    Collection Time: 01/25/20  8:25 AM   Result Value Ref Range    Troponin T 6 6 - 19 ng/L   proBrain Natriuretic Peptide, NT    Collection Time: 01/25/20  8:25 AM   Result Value Ref Range    NT-proBNP 296 (H) 0 - 125 pg/mL   URINALYSIS,CULTURE IF INDICATED    Collection Time: 01/25/20  9:50 AM   Result Value Ref Range    Color Yellow     Character Clear     Specific Gravity 1.014 <1.035    Ph 6.5 5.0 - 8.0    Glucose Negative Negative mg/dL    Ketones Negative Negative mg/dL    Protein Negative Negative mg/dL     Bilirubin Negative Negative    Urobilinogen, Urine 0.2 Negative    Nitrite Negative Negative    Leukocyte Esterase Trace (A) Negative    Occult Blood Trace (A) Negative    Micro Urine Req Microscopic    URINE MICROSCOPIC (W/UA)    Collection Time: 01/25/20  9:50 AM   Result Value Ref Range    WBC 0-2 /hpf    RBC 2-5 (A) /hpf    Bacteria Few (A) None /hpf    Epithelial Cells Few /hpf    Hyaline Cast 0-2 /lpf       Assessment:     1. Incomplete right bundle branch block (RBBB)  EC-ECHOCARDIOGRAM COMPLETE W/O CONT   2. Dyslipidemia     3. PAF (paroxysmal atrial fibrillation) (HCC)  EC-ECHOCARDIOGRAM COMPLETE W/O CONT   4. Essential hypertension  EC-ECHOCARDIOGRAM COMPLETE W/O CONT   5. Tobacco abuse         Medical Decision Making:  Today's Assessment / Status / Plan:     It was my pleasure to meet with Ms. Barrera.    Blood pressure is well controlled.      She is on appropriate statin.  Given her LDL and guidelines we will switch her to low-dose atorvastatin    Doesn't need to take aspirin felt to have low benefit    She is been doing well on metoprolol continue monitor for palpitations last episode of A. fib was 6 years ago    We will check an Echo for increase BNP    Safe to do cholecystectomy with low cardiovascular risk does not need to do the echo per se is more for overall health status    I spent 5 minutes of face to face counseling on the vital need for smoking cessation.  We discussed pharmacotherapy measures for quiting including nicotine replacement.    We will follow up with Ms. Barrera on the results of the testing over the phone. We will determine further follow-up from there.  It is my pleasure to participate in the care of Ms. Barrera.  Please do not hesitate to contact me with questions or concerns.    Cedrick Malagon MD PhD [facility]  Cardiologist [facility]    Please note that this dictation was created using voice recognition software. I have worked with consultants  from the vendor as well as technical experts from [facility] to optimize the interface. I have made every reasonable attempt to correct obvious errors, but I expect that there are errors of grammar and possibly content I did not discover before finalizing the note.

## 2020-02-10 NOTE — LETTER
PROCEDURE/SURGERY CLEARANCE FORM      Encounter Date: 2/10/2020    Patient: Rica Barrera  YOB: 1955    CARDIOLOGIST:  Cedrick Malagon M.D.    REFERRING DOCTOR:  Yumiko Manning        The above patient is cleared to have the following procedure/surgery: jordin    She is safe to proceed, no testing required, hold antiplatelet as necessary.    It is my pleasure to participate in the care of Ms. Barrera.  Please do not hesitate to contact me with questions or concerns. [facility] is available 24/7 for consultative services at [phone] in the perioperative period.    Electronically Signed    Cedrick Malagon MD PhD [facility]  Cardiologist [facility]    Please note that this dictation was created using voice recognition software. I have worked with consultants from the vendor as well as technical experts from [facility] to optimize the interface. I have made every reasonable attempt to correct obvious errors, but I expect that there are errors of grammar and possibly content I did not discover before finalizing the note.

## 2020-02-10 NOTE — LETTER
[facility]   [address], [address]  [place] [address]  Phone: [phone]  Fax: [phone]              Rica Barrera  1955    Encounter Date: 2/10/2020    Cedrick Malagon M.D.          PROGRESS NOTE:  Chief Complaint   Patient presents with   • New Patient     Pior Cardio/Hyperlipemia       Subjective:   Rica Barrera is a 64 y.o. female who presents today in consultation from Dr. Kristine Garcia for evaluation of abnormal BNP she recently presented for right upper quadrant pain to be biliary in nature she had a cardiac evaluation [place] where she was diagnosed with paroxysmal atrial fibrillation possibility for abnormal stress test and when she moved here to [place] she saw Dr. Maharaj and he had a stress test with stress echo which was normal she is also treated for dyslipidemia and maintained on metoprolol and she has been stable for heart health.    Past Medical History:   Diagnosis Date   • Hyperlipidemia    • Incomplete right bundle branch block (RBBB)    • PAF (paroxysmal atrial fibrillation) ([facility]) 1/2014    • Tobacco abuse      Past Surgical History:   Procedure Laterality Date   • PRIMARY C SECTION     • VEIN STRIPPING       Family History   Problem Relation Age of Onset   • Heart Disease Mother    • Hypertension Mother    • Dementia Father    • Cancer Father         spine   • Diabetes Father    • Cancer Sister 41        breast   • Cancer Maternal Grandmother         leukemia   • Arrythmia Brother         WPW     Social History     Socioeconomic History   • Marital status:      Spouse name: Not on file   • Number of children: Not on file   • Years of education: Not on file   • Highest education level: Not on file   Occupational History   • Not on file   Social Needs   • Financial resource strain: Not on file   • Food insecurity:     Worry: Not on file     Inability: Not on file   • Transportation needs:     Medical: Not on file    Non-medical: Not on file   Tobacco Use   • Smoking status: Current Every Day Smoker     Packs/day: 0.50     Types: Cigarettes   • Smokeless tobacco: Never Used   Substance and Sexual Activity   • Alcohol use: No     Alcohol/week: 0.0 oz   • Drug use: No   • Sexual activity: Yes     Partners: Male     Comment: - Efrain   Lifestyle   • Physical activity:     Days per week: Not on file     Minutes per session: Not on file   • Stress: Not on file   Relationships   • Social connections:     Talks on phone: Not on file     Gets together: Not on file     Attends Holiness service: Not on file     Active member of club or organization: Not on file     Attends meetings of clubs or organizations: Not on file     Relationship status: Not on file   • Intimate partner violence:     Fear of current or ex partner: Not on file     Emotionally abused: Not on file     Physically abused: Not on file     Forced sexual activity: Not on file   Other Topics Concern   • Not on file   Social History Narrative   • Not on file     No Known Allergies  Outpatient Encounter Medications as of 2/10/2020   Medication Sig Dispense Refill   • HYDROcodone-acetaminophen (NORCO) 5-325 MG Tab per tablet TK 1 T PO  Q 6 H PRN FOR PAIN. TO LAST 7 DAYS     • atorvastatin (LIPITOR) 20 MG Tab Take 1 Tab by mouth every day. 90 Tab 3   • metoprolol SR (TOPROL XL) 25 MG TABLET SR 24 HR TAKE 1/2 (ONE-HALF) TABLET BY MOUTH ONCE DAILY 45 Tab 1   • Misc Natural Products (TURMERIC CURCUMIN) Cap Take  by mouth.     • Cholecalciferol (VITAMIN D3) 2000 UNIT Cap Take  by mouth.     • Potassium (POTASSIMIN PO) Take  by mouth.     • Black Pepper-Turmeric (TURMERIC COMPLEX/BLACK PEPPER PO) Take  by mouth.     • MAGNESIUM PO Take  by mouth.     • B Complex Vitamins (VITAMIN B COMPLEX PO) Take  by mouth.     • aspirin 81 MG tablet Take 162 mg by mouth every day.     • Coenzyme Q10 (CO Q 10 PO) Take  by mouth every day.     • [DISCONTINUED] ondansetron (ZOFRAN ODT) 4 MG  "TABLET DISPERSIBLE Take 1 Tab by mouth every 6 hours as needed for Nausea. (Patient not taking: Reported on 2/10/2020) 10 Tab 0   • [DISCONTINUED] lovastatin (MEVACOR) 40 MG tablet TAKE ONE TABLET BY MOUTH ONCE DAILY 90 Tab 3     No facility-administered encounter medications on file as of 2/10/2020.      Review of Systems   Constitutional: Negative for chills and fever.   HENT: Negative for sore throat.    Eyes: Negative for blurred vision.   Respiratory: Negative for cough and shortness of breath.    Cardiovascular: Negative for chest pain, palpitations, claudication, leg swelling and PND.   Gastrointestinal: Negative for abdominal pain and nausea.   Musculoskeletal: Negative for falls and joint pain.   Skin: Negative for rash.   Neurological: Negative for dizziness, focal weakness and weakness.   Endo/Heme/Allergies: Does not bruise/bleed easily.        Objective:   /80 (BP Location: Left arm, Patient Position: Sitting, BP Cuff Size: Adult)   Pulse 68   Ht 1.676 m (5' 6\")   Wt 111.9 kg (246 lb 12.8 oz)   SpO2 96%   BMI 39.83 kg/m²      Physical Exam   Constitutional: No distress.   HENT:   Mouth/Throat: Oropharynx is clear and moist. No oropharyngeal exudate.   Eyes: No scleral icterus.   Neck: No JVD present.   Cardiovascular: Normal rate and normal heart sounds. Exam reveals no gallop and no friction rub.   No murmur heard.  Pulmonary/Chest: No respiratory distress. She has no wheezes. She has no rales.   Abdominal: Soft. Bowel sounds are normal.   Musculoskeletal:         General: No edema.   Neurological: She is alert.   Skin: No rash noted. She is not diaphoretic.   Psychiatric: She has a normal mood and affect.     We reviewed in person the most recent labs  Recent Results (from the past 4032 hour(s))   POCT Urinalysis    Collection Time: 01/23/20  1:58 PM   Result Value Ref Range    POC Color yellow Negative    POC Appearance clear Negative    POC Leukocyte Esterase trace Negative    POC " Nitrites negative Negative    POC Urobiligen 0.2 Negative (0.2) mg/dL    POC Protein negative Negative mg/dL    POC Urine PH 5.0 5.0 - 8.0    POC Blood moderate Negative    POC Specific Gravity 1.025 <1.005 - >1.030    POC Ketones negative Negative mg/dL    POC Bilirubin negative Negative mg/dL    POC Glucose negative Negative mg/dL   POCT Urinalysis    Collection Time: 20  6:47 PM   Result Value Ref Range    POC Color YELLOW Negative    POC Appearance CLEAR Negative    POC Leukocyte Esterase NEG Negative    POC Nitrites NEG Negative    POC Urobiligen 0.2 Negative (0.2) mg/dL    POC Protein NEG Negative mg/dL    POC Urine PH 5.5 5.0 - 8.0    POC Blood SM Negative    POC Specific Gravity 1.015 <1.005 - >1.030    POC Ketones NEG Negative mg/dL    POC Bilirubin NEG Negative mg/dL    POC Glucose NEG Negative mg/dL   EKG    Collection Time: 20  8:13 AM   Result Value Ref Range    Report       [facility] Emergency Dept.    Test Date:  2020  Pt Name:    MUKESH LAI                    Department: ER  MRN:        [id number]                      Room:  Gender:     Female                       Technician: [id number]  :        1955                   Requested By:ER TRIAGE PROTOCOL  Order #:    [id number]                    Reading MD: YOLY KUO DO    Measurements  Intervals                                Axis  Rate:       67                           P:          25  GA:         154                          QRS:        -44  QRSD:       102                          T:          21  QT:         390  QTc:        412    Interpretive Statements  Sinus rhythm  Incomplete RBBB and LAFB  RSR' in V1 or V2, right VCD or RVH  No previous ECG available for comparison  Electronically Signed On 2020 11:47:09 PST by YOLY KUO DO     CBC WITH DIFFERENTIAL    Collection Time: 20  8:25 AM   Result Value Ref Range    WBC 7.5 4.8 - 10.8 K/uL    RBC 4.93 4.20 - 5.40 M/uL     Hemoglobin 15.3 12.0 - 16.0 g/dL    Hematocrit 45.3 37.0 - 47.0 %    MCV 91.9 81.4 - 97.8 fL    MCH 31.0 27.0 - 33.0 pg    MCHC 33.8 33.6 - 35.0 g/dL    RDW 43.0 35.9 - 50.0 fL    Platelet Count 248 164 - 446 K/uL    MPV 9.3 9.0 - 12.9 fL    Neutrophils-Polys 76.10 (H) 44.00 - 72.00 %    Lymphocytes 15.40 (L) 22.00 - 41.00 %    Monocytes 6.30 0.00 - 13.40 %    Eosinophils 1.70 0.00 - 6.90 %    Basophils 0.40 0.00 - 1.80 %    Immature Granulocytes 0.10 0.00 - 0.90 %    Nucleated RBC 0.00 /100 WBC    Neutrophils (Absolute) 5.72 2.00 - 7.15 K/uL    Lymphs (Absolute) 1.16 1.00 - 4.80 K/uL    Monos (Absolute) 0.47 0.00 - 0.85 K/uL    Eos (Absolute) 0.13 0.00 - 0.51 K/uL    Baso (Absolute) 0.03 0.00 - 0.12 K/uL    Immature Granulocytes (abs) 0.01 0.00 - 0.11 K/uL    NRBC (Absolute) 0.00 K/uL   COMP METABOLIC PANEL    Collection Time: 01/25/20  8:25 AM   Result Value Ref Range    Sodium 141 135 - 145 mmol/L    Potassium 4.1 3.6 - 5.5 mmol/L    Chloride 104 96 - 112 mmol/L    Co2 27 20 - 33 mmol/L    Anion Gap 10.0 0.0 - 11.9    Glucose 136 (H) 65 - 99 mg/dL    Bun 16 8 - 22 mg/dL    Creatinine 1.06 0.50 - 1.40 mg/dL    Calcium 9.7 8.5 - 10.5 mg/dL    AST(SGOT) 15 12 - 45 U/L    ALT(SGPT) 14 2 - 50 U/L    Alkaline Phosphatase 79 30 - 99 U/L    Total Bilirubin 0.4 0.1 - 1.5 mg/dL    Albumin 4.3 3.2 - 4.9 g/dL    Total Protein 7.6 6.0 - 8.2 g/dL    Globulin 3.3 1.9 - 3.5 g/dL    A-G Ratio 1.3 g/dL   LIPASE    Collection Time: 01/25/20  8:25 AM   Result Value Ref Range    Lipase 8 (L) 11 - 82 U/L   ESTIMATED GFR    Collection Time: 01/25/20  8:25 AM   Result Value Ref Range    GFR If African American >60 >60 mL/min/1.73 m 2    GFR If Non African American 52 (A) >60 mL/min/1.73 m 2   TROPONIN    Collection Time: 01/25/20  8:25 AM   Result Value Ref Range    Troponin T 6 6 - 19 ng/L   proBrain Natriuretic Peptide, NT    Collection Time: 01/25/20  8:25 AM   Result Value Ref Range    NT-proBNP 296 (H) 0 - 125 pg/mL      URINALYSIS,CULTURE IF INDICATED    Collection Time: 01/25/20  9:50 AM   Result Value Ref Range    Color Yellow     Character Clear     Specific Gravity 1.014 <1.035    Ph 6.5 5.0 - 8.0    Glucose Negative Negative mg/dL    Ketones Negative Negative mg/dL    Protein Negative Negative mg/dL    Bilirubin Negative Negative    Urobilinogen, Urine 0.2 Negative    Nitrite Negative Negative    Leukocyte Esterase Trace (A) Negative    Occult Blood Trace (A) Negative    Micro Urine Req Microscopic    URINE MICROSCOPIC (W/UA)    Collection Time: 01/25/20  9:50 AM   Result Value Ref Range    WBC 0-2 /hpf    RBC 2-5 (A) /hpf    Bacteria Few (A) None /hpf    Epithelial Cells Few /hpf    Hyaline Cast 0-2 /lpf       Assessment:     1. Incomplete right bundle branch block (RBBB)  EC-ECHOCARDIOGRAM COMPLETE W/O CONT   2. Dyslipidemia     3. PAF (paroxysmal atrial fibrillation) (HCC)  EC-ECHOCARDIOGRAM COMPLETE W/O CONT   4. Essential hypertension  EC-ECHOCARDIOGRAM COMPLETE W/O CONT   5. Tobacco abuse         Medical Decision Making:  Today's Assessment / Status / Plan:     It was my pleasure to meet with Ms. Barrera.    Blood pressure is well controlled.      She is on appropriate statin.  Given her LDL and guidelines we will switch her to low-dose atorvastatin    Doesn't need to take aspirin felt to have low benefit    She is been doing well on metoprolol continue monitor for palpitations last episode of A. fib was 6 years ago    We will check an Echo for increase BNP    Safe to do cholecystectomy with low cardiovascular risk does not need to do the echo per se is more for overall health status    I spent 5 minutes of face to face counseling on the vital need for smoking cessation.  We discussed pharmacotherapy measures for quiting including nicotine replacement.    We will follow up with Ms. Barrera on the results of the testing over the phone. We will determine further follow-up from there.  It is my pleasure to participate in the  care of Ms. Barrera.  Please do not hesitate to contact me with questions or concerns.    Cedrick Malagon MD PhD [facility]  Cardiologist [facility]    Please note that this dictation was created using voice recognition software. I have worked with consultants from the vendor as well as technical experts from [facility] to optimize the interface. I have made every reasonable attempt to correct obvious errors, but I expect that there are errors of grammar and possibly content I did not discover before finalizing the note.         Jian Alves D.O.  [address] [place] [address] Emergency Room  [address]  [facility] [address]  VIA Facsimile: [phone]     Chaira Manning M.D.  [address]  [address]  [place] [address]  VIA Facsimile: [phone]

## 2020-02-12 RX ORDER — CLONAZEPAM 0.5 MG/1
0.25 TABLET ORAL 2 TIMES DAILY PRN
Qty: 30 TAB | Refills: 0
Start: 2020-02-12 | End: 2020-03-13

## 2020-02-13 DIAGNOSIS — M54.6 ACUTE RIGHT-SIDED THORACIC BACK PAIN: ICD-10-CM

## 2020-02-13 RX ORDER — KETOROLAC TROMETHAMINE 10 MG/1
10 TABLET, FILM COATED ORAL 3 TIMES DAILY PRN
Qty: 15 TAB | Refills: 0 | Status: SHIPPED | OUTPATIENT
Start: 2020-02-13 | End: 2020-02-18

## 2020-02-13 NOTE — TELEPHONE ENCOUNTER
1. Caller Name:[place]    Call Back Number: [phone] (home)       How would the patient prefer to be contacted with a response: Did not specify    Pt called asking for a refill on the Rx Ketoralac that she had been prescribed in the ED. Please advise.     Pt stated that she will be having her gallbladder removed and she will be needing this medication to recover post surgery.    ketorolac (TORADOL) 10 MG Tab    Received request via: Patient    Was the patient seen in the last year in this department? Yes    Does the patient have an active prescription (recently filled or refills available) for medication(s) requested? No

## 2020-02-25 ENCOUNTER — APPOINTMENT (OUTPATIENT)
Dept: CARDIOLOGY | Facility: MEDICAL CENTER | Age: 65
End: 2020-02-25
Attending: INTERNAL MEDICINE
Payer: MEDICARE

## 2020-03-11 ENCOUNTER — OFFICE VISIT (OUTPATIENT)
Dept: MEDICAL GROUP | Facility: PHYSICIAN GROUP | Age: 65
End: 2020-03-11
Payer: MEDICARE

## 2020-03-11 ENCOUNTER — HOSPITAL ENCOUNTER (OUTPATIENT)
Dept: CARDIOLOGY | Facility: MEDICAL CENTER | Age: 65
End: 2020-03-11
Attending: INTERNAL MEDICINE
Payer: MEDICARE

## 2020-03-11 VITALS
OXYGEN SATURATION: 93 % | DIASTOLIC BLOOD PRESSURE: 74 MMHG | BODY MASS INDEX: 38.89 KG/M2 | HEIGHT: 66 IN | HEART RATE: 96 BPM | TEMPERATURE: 99.1 F | RESPIRATION RATE: 18 BRPM | WEIGHT: 242 LBS | SYSTOLIC BLOOD PRESSURE: 110 MMHG

## 2020-03-11 DIAGNOSIS — Z90.49 S/P LAPAROSCOPIC CHOLECYSTECTOMY: ICD-10-CM

## 2020-03-11 DIAGNOSIS — M81.0 POSTMENOPAUSAL BONE LOSS: ICD-10-CM

## 2020-03-11 DIAGNOSIS — Z12.31 ENCOUNTER FOR SCREENING MAMMOGRAM FOR MALIGNANT NEOPLASM OF BREAST: ICD-10-CM

## 2020-03-11 DIAGNOSIS — Z12.11 SCREENING FOR COLON CANCER: ICD-10-CM

## 2020-03-11 DIAGNOSIS — Z12.39 SCREENING FOR BREAST CANCER: ICD-10-CM

## 2020-03-11 DIAGNOSIS — I48.0 PAF (PAROXYSMAL ATRIAL FIBRILLATION) (HCC): ICD-10-CM

## 2020-03-11 DIAGNOSIS — D48.9 NEOPLASM OF UNCERTAIN BEHAVIOR: ICD-10-CM

## 2020-03-11 DIAGNOSIS — F41.0 PANIC ATTACK: ICD-10-CM

## 2020-03-11 DIAGNOSIS — Z23 NEED FOR VACCINATION: ICD-10-CM

## 2020-03-11 DIAGNOSIS — E78.5 DYSLIPIDEMIA: ICD-10-CM

## 2020-03-11 DIAGNOSIS — I10 ESSENTIAL HYPERTENSION: ICD-10-CM

## 2020-03-11 DIAGNOSIS — I45.10 INCOMPLETE RIGHT BUNDLE BRANCH BLOCK (RBBB): Chronic | ICD-10-CM

## 2020-03-11 LAB
LV EJECT FRACT  99904: 65
LV EJECT FRACT MOD 2C 99903: 67.18
LV EJECT FRACT MOD 4C 99902: 64.84
LV EJECT FRACT MOD BP 99901: 65.68

## 2020-03-11 PROCEDURE — 93306 TTE W/DOPPLER COMPLETE: CPT | Mod: 26 | Performed by: INTERNAL MEDICINE

## 2020-03-11 PROCEDURE — 90715 TDAP VACCINE 7 YRS/> IM: CPT | Performed by: FAMILY MEDICINE

## 2020-03-11 PROCEDURE — 93306 TTE W/DOPPLER COMPLETE: CPT

## 2020-03-11 PROCEDURE — G0009 ADMIN PNEUMOCOCCAL VACCINE: HCPCS | Performed by: FAMILY MEDICINE

## 2020-03-11 PROCEDURE — 90670 PCV13 VACCINE IM: CPT | Performed by: FAMILY MEDICINE

## 2020-03-11 PROCEDURE — 99214 OFFICE O/P EST MOD 30 MIN: CPT | Mod: 25 | Performed by: FAMILY MEDICINE

## 2020-03-11 PROCEDURE — 90472 IMMUNIZATION ADMIN EACH ADD: CPT | Performed by: FAMILY MEDICINE

## 2020-03-11 RX ORDER — CLONAZEPAM 0.5 MG/1
0.25 TABLET ORAL 2 TIMES DAILY PRN
Qty: 60 TAB | Refills: 0 | Status: SHIPPED | OUTPATIENT
Start: 2020-03-11 | End: 2020-04-10

## 2020-03-11 RX ORDER — OXYCODONE HYDROCHLORIDE 5 MG/1
5 TABLET ORAL
COMMUNITY
Start: 2020-02-21 | End: 2020-03-10

## 2020-03-11 RX ORDER — DOCUSATE SODIUM 100 MG/1
CAPSULE, LIQUID FILLED ORAL
COMMUNITY
Start: 2020-02-12 | End: 2020-03-10

## 2020-03-11 ASSESSMENT — FIBROSIS 4 INDEX: FIB4 SCORE: 1.07

## 2020-03-11 ASSESSMENT — PATIENT HEALTH QUESTIONNAIRE - PHQ9: CLINICAL INTERPRETATION OF PHQ2 SCORE: 0

## 2020-03-11 NOTE — PROGRESS NOTES
Chief Complaint   Patient presents with   • Gallbladder     fv        HISTORY OF PRESENT ILLNESS: Patient is a 64 y.o. female established patient here today for the following concerns:    1. S/P laparoscopic cholecystectomy  This is a pleasant 64-year-old female here today for follow-up.  She had presented to the emergency room with chest pain and some right upper quadrant pain.  She ruled out for ACS and followed up with us finding likely gallbladder colic.  She has since undergone cholecystectomy and is feeling much better no further chest pain or abdominal pain.  No fevers or chills.    2. Panic attack  In addition is here for follow-up.  She reports that she has had some stressful events happening and does request a refill on her clonazepam for panic attacks.    3. Neoplasm of uncertain behavior  She also reports that for the last year she has had a small lesion just under the left lower eyelid and several under the right lid.  There is also a small lesion on the right upper back.  She would like to consult with dermatology to see if these need to be removed.  No previous history of skin cancer.    4. Screening for breast cancer  Due for breast cancer screening  5. Postmenopausal bone loss  Due for bone density testing    6. Encounter for screening mammogram for malignant neoplasm of breast   As above    7. Screening for colon cancer  Due for colonoscopy, although she declines this she is amendable to Cologuard testing    8. Need for vaccination  Due for Tdap and Prevnar    9. Dyslipidemia  Patient recently was changed to atorvastatin 20 mg.  She does not have any follow-up lab schedule to her knowledge.  Tolerating the medicine fine without any myalgias or chest pain.      Past Medical, Social, and Family history reviewed and updated in EPIC    Allergies:Patient has no known allergies.    Current Outpatient Medications   Medication Sig Dispense Refill   • clonazePAM (KLONOPIN) 0.5 MG Tab Take 0.5 Tabs by mouth  "2 times a day as needed for up to 30 days. 60 Tab 0   • atorvastatin (LIPITOR) 20 MG Tab Take 1 Tab by mouth every day. 90 Tab 3   • metoprolol SR (TOPROL XL) 25 MG TABLET SR 24 HR TAKE 1/2 (ONE-HALF) TABLET BY MOUTH ONCE DAILY 45 Tab 1   • Potassium (POTASSIMIN PO) Take  by mouth.     • Black Pepper-Turmeric (TURMERIC COMPLEX/BLACK PEPPER PO) Take  by mouth.     • MAGNESIUM PO Take  by mouth.     • B Complex Vitamins (VITAMIN B COMPLEX PO) Take  by mouth.     • Cholecalciferol (VITAMIN D3) 2000 UNIT Cap Take  by mouth.       No current facility-administered medications for this visit.          ROS:  Review of Systems   Constitutional: Negative for fever, chills, weight loss and malaise/fatigue.   HENT: Negative for ear pain, nosebleeds, congestion, sore throat and neck pain.    Eyes: Negative for blurred vision.   Respiratory: Negative for cough, sputum production, shortness of breath and wheezing.    Cardiovascular: Negative for chest pain, palpitations,  and leg swelling.   Gastrointestinal: Negative for heartburn, nausea, vomiting, diarrhea and abdominal pain.   Genitourinary: Negative for dysuria, urgency and frequency.   Musculoskeletal: Negative for myalgias, back pain and joint pain.   Skin: Negative for rash and itching.   Neurological: Negative for dizziness, tingling, tremors, sensory change, focal weakness and headaches.   Endo/Heme/Allergies: Does not bruise/bleed easily.   Psychiatric/Behavioral: Negative for depression, anxiety, suicidal ideas, insomnia and memory loss.      Exam:  /74   Pulse 96   Temp 37.3 °C (99.1 °F)   Resp 18   Ht 1.676 m (5' 6\")   Wt 109.8 kg (242 lb)   SpO2 93%     General:  Well nourished, well developed in NAD  Head is grossly normal.  Neck: Supple without JVD   Pulmonary:  Normal effort.   Cardiovascular: Regular rate  Extremities: no clubbing, cyanosis, or edema.  Psych: affect appropriate  Skin: Left lower lid lateral aspect with white slightly raised pearly " lesion possible cholesterol deposit versus early basal cell, right lower lid further down the cheek is several small slightly raised lesions 1 with telangiectasia more laterally approximately 2 mm in diameter, another keratosis slightly scaling.  Lastly on the upper back there is some scaling with a pigmented slightly raised lesion consistent with SK    Please note that this dictation was created using voice recognition software. I have made every reasonable attempt to correct obvious errors, but I expect that there are errors of grammar and possibly content that I did not discover before finalizing the note.    Assessment/Plan:  1. S/P laparoscopic cholecystectomy  Doing well.     2. Panic attack  - clonazePAM (KLONOPIN) 0.5 MG Tab; Take 0.5 Tabs by mouth 2 times a day as needed for up to 30 days.  Dispense: 60 Tab; Refill: 0    3. Neoplasm of uncertain behavior  - REFERRAL TO DERMATOLOGY    4. Screening for breast cancer  - MA-SCREENING MAMMO BILAT W/CAD; Future    5. Postmenopausal bone loss  - DS-BONE DENSITY STUDY (DEXA); Future    6. Encounter for screening mammogram for malignant neoplasm of breast   - MA-SCREENING MAMMO BILAT W/CAD; Future    7. Screening for colon cancer  - COLOGUARD (FIT DNA)    8. Need for vaccination  - Tdap =>6yo IM  - Prevnar 13 PCV-13    9. Dyslipidemia  Recheck levels in the next month   - Comp Metabolic Panel; Future  - Lipid Profile; Future    6 month follow up

## 2020-03-13 ENCOUNTER — TELEPHONE (OUTPATIENT)
Dept: CARDIOLOGY | Facility: MEDICAL CENTER | Age: 65
End: 2020-03-13

## 2020-03-13 NOTE — TELEPHONE ENCOUNTER
Result Notes for EC-ECHOCARDIOGRAM COMPLETE W/O CONT   Notes recorded by Cedrick Malagon M.D. on 3/11/2020 at 12:19 PM PDT    The echo looks good, please let her know     Thank you     Letter printed with MD recommendations and mailed to pt mailing address.

## 2020-08-01 DIAGNOSIS — I48.0 PAF (PAROXYSMAL ATRIAL FIBRILLATION) (HCC): ICD-10-CM

## 2020-08-01 DIAGNOSIS — I10 ESSENTIAL HYPERTENSION: ICD-10-CM

## 2020-08-04 RX ORDER — METOPROLOL SUCCINATE 25 MG/1
TABLET, EXTENDED RELEASE ORAL
Qty: 45 TAB | Refills: 0 | Status: SHIPPED | OUTPATIENT
Start: 2020-08-04 | End: 2020-12-22

## 2020-10-22 ENCOUNTER — APPOINTMENT (OUTPATIENT)
Dept: RADIOLOGY | Facility: MEDICAL CENTER | Age: 65
End: 2020-10-22
Attending: FAMILY MEDICINE
Payer: MEDICARE

## 2020-10-22 DIAGNOSIS — Z12.31 VISIT FOR SCREENING MAMMOGRAM: ICD-10-CM

## 2020-12-18 DIAGNOSIS — I48.0 PAF (PAROXYSMAL ATRIAL FIBRILLATION) (HCC): ICD-10-CM

## 2020-12-18 DIAGNOSIS — I10 ESSENTIAL HYPERTENSION: ICD-10-CM

## 2020-12-22 RX ORDER — METOPROLOL SUCCINATE 25 MG/1
TABLET, EXTENDED RELEASE ORAL
Qty: 45 TAB | Refills: 1 | Status: SHIPPED | OUTPATIENT
Start: 2020-12-22 | End: 2021-08-30 | Stop reason: SDUPTHER

## 2020-12-22 NOTE — TELEPHONE ENCOUNTER
Refill X 6 months, sent to pharmacy.Pt. Seen in the last 6 months per protocol.   Lab Results   Component Value Date/Time    SODIUM 141 01/25/2020 08:25 AM    POTASSIUM 4.1 01/25/2020 08:25 AM    CHLORIDE 104 01/25/2020 08:25 AM    CO2 27 01/25/2020 08:25 AM    GLUCOSE 136 (H) 01/25/2020 08:25 AM    BUN 16 01/25/2020 08:25 AM    CREATININE 1.06 01/25/2020 08:25 AM

## 2021-04-01 ENCOUNTER — OFFICE VISIT (OUTPATIENT)
Dept: MEDICAL GROUP | Facility: PHYSICIAN GROUP | Age: 66
End: 2021-04-01
Payer: MEDICARE

## 2021-04-01 VITALS
WEIGHT: 242.6 LBS | BODY MASS INDEX: 38.99 KG/M2 | DIASTOLIC BLOOD PRESSURE: 68 MMHG | OXYGEN SATURATION: 94 % | SYSTOLIC BLOOD PRESSURE: 106 MMHG | HEIGHT: 66 IN | TEMPERATURE: 97.6 F | HEART RATE: 73 BPM

## 2021-04-01 DIAGNOSIS — E55.9 VITAMIN D DEFICIENCY: ICD-10-CM

## 2021-04-01 DIAGNOSIS — Z78.0 POSTMENOPAUSAL STATUS (AGE-RELATED) (NATURAL): ICD-10-CM

## 2021-04-01 DIAGNOSIS — E66.9 OBESITY (BMI 35.0-39.9 WITHOUT COMORBIDITY): ICD-10-CM

## 2021-04-01 DIAGNOSIS — R29.898 HAND WEAKNESS: ICD-10-CM

## 2021-04-01 DIAGNOSIS — Z12.31 ENCOUNTER FOR SCREENING MAMMOGRAM FOR BREAST CANCER: ICD-10-CM

## 2021-04-01 DIAGNOSIS — E78.5 DYSLIPIDEMIA: ICD-10-CM

## 2021-04-01 DIAGNOSIS — Z79.899 HIGH RISK MEDICATION USE: ICD-10-CM

## 2021-04-01 DIAGNOSIS — I48.0 PAF (PAROXYSMAL ATRIAL FIBRILLATION) (HCC): Chronic | ICD-10-CM

## 2021-04-01 DIAGNOSIS — F41.0 PANIC ATTACK: ICD-10-CM

## 2021-04-01 DIAGNOSIS — Z72.0 TOBACCO ABUSE: ICD-10-CM

## 2021-04-01 DIAGNOSIS — R79.89 ABNORMAL CBC: ICD-10-CM

## 2021-04-01 DIAGNOSIS — I10 ESSENTIAL HYPERTENSION: ICD-10-CM

## 2021-04-01 DIAGNOSIS — R73.01 IMPAIRED FASTING BLOOD SUGAR: ICD-10-CM

## 2021-04-01 PROBLEM — M25.562 ACUTE PAIN OF LEFT KNEE: Status: RESOLVED | Noted: 2019-03-25 | Resolved: 2021-04-01

## 2021-04-01 PROCEDURE — 99214 OFFICE O/P EST MOD 30 MIN: CPT | Performed by: PHYSICIAN ASSISTANT

## 2021-04-01 RX ORDER — CLONAZEPAM 0.5 MG/1
TABLET ORAL 2 TIMES DAILY
COMMUNITY
End: 2022-02-28 | Stop reason: SDUPTHER

## 2021-04-01 ASSESSMENT — PATIENT HEALTH QUESTIONNAIRE - PHQ9: CLINICAL INTERPRETATION OF PHQ2 SCORE: 0

## 2021-04-01 ASSESSMENT — FIBROSIS 4 INDEX: FIB4 SCORE: 1.08

## 2021-04-01 NOTE — PROGRESS NOTES
"Subjective:     CC: Establish care, PAF    HPI:   [place] presents today with     PAF (paroxysmal atrial fibrillation) ([facility]) 1/2014  Currently rate controlled with metoprolol SR 25 mg.  Was seen by cardiology a year ago and had an echo that was normal and they did not need her to follow-up at that time. Can normally feel when she is in A fib and has not had any episodes in many months.     Panic attack  Was previously given clonazepam by her previous provider for panic attacks.    Hand weakness  Noticed it started a long time ago. Especially with cutting meat. Makes her hands \"stick\".     Dyslipidemia  Taking atorvastatin 20 mg everyday. Cardiology dropped the dose down last year.     Essential hypertension  Only taking metoprolol for blood pressure.     BMI 39.0-39.9,adult  Weight is down. Is eating sweets but cut back on portions and has been losing weight.     Obesity (BMI 35.0-39.9 without comorbidity) ([facility])  Has been exercising by chasing her grandkid around. Working on portion control.       Past Medical History:   Diagnosis Date   • Hyperlipidemia    • Incomplete right bundle branch block (RBBB)    • PAF (paroxysmal atrial fibrillation) ([facility]) 1/2014    • Tobacco abuse        Social History     Tobacco Use   • Smoking status: Current Every Day Smoker     Packs/day: 0.50     Years: 50.00     Pack years: 25.00     Types: Cigarettes   • Smokeless tobacco: Never Used   • Tobacco comment: 4-5 cigarettes a day   Substance Use Topics   • Alcohol use: No     Alcohol/week: 0.0 oz   • Drug use: No       Current Outpatient Medications Ordered in Epic   Medication Sig Dispense Refill   • clonazePAM (KLONOPIN) 0.5 MG Tab Take  by mouth 2 Times a Day.     • nicotine (NICODERM) 7 MG/24HR PATCH 24 HR Place 1 Patch on the skin every 24 hours. 30 Patch 11   • metoprolol SR (TOPROL XL) 25 MG TABLET SR 24 HR Take 1/2 (one-half) tablet by mouth once daily 45 Tab 1   • atorvastatin (LIPITOR) 20 MG Tab Take 1 Tab by mouth every day. 90 " "Tab 3   • Cholecalciferol (VITAMIN D3) 2000 UNIT Cap Take  by mouth.     • Potassium (POTASSIMIN PO) Take  by mouth.     • MAGNESIUM PO Take  by mouth.     • B Complex Vitamins (VITAMIN B COMPLEX PO) Take  by mouth.     • Black Pepper-Turmeric (TURMERIC COMPLEX/BLACK PEPPER PO) Take  by mouth.       No current Epic-ordered facility-administered medications on file.       Allergies:  Patient has no known allergies.    Health Maintenance: Completed    ROS:  Gen: no fevers/chills  Eyes: no changes in vision  ENT: no sore throat  Pulm: Occasional shortness of breath  CV: no chest pain  GI: no nausea/vomiting  : no dysuria  MSk: no myalgias  Skin: no rash  Neuro: no headaches  Psych: Positive for panic attacks      Objective:       Exam:  /68   Pulse 73   Temp 36.4 °C (97.6 °F) (Temporal)   Ht 1.676 m (5' 6\")   Wt 110 kg (242 lb 9.6 oz)   SpO2 94%   BMI 39.16 kg/m²  Body mass index is 39.16 kg/m².    Gen: Alert and oriented, No apparent distress.  Skin: Warm, dry, good turgor, no rashes in visible areas.  HEENT: Normocephalic. Eyes conjunctiva clear lids without ptosis, pupils equal and reactive to light accommodation, ears normal shape and contour, canals are clear bilaterally, tympanic membranes are benign, nasal mucosa benign, oropharynx is without erythema, edema or exudates.   Neck: Trachea midline, no masses, no thyromegaly  Lungs: Normal effort, CTA bilaterally, no wheezes, rhonchi, or rales  CV: Regular rate and rhythm. No murmurs, rubs, or gallops.  MSK: Normal gait, moves all extremities.  Neuro: Grossly non-focal.  Ext: No clubbing, cyanosis, edema.  Psych: Alert and oriented x3, normal affect and mood.     Labs: Labs from 1/25/2020 were reviewed and discussed with the patient. All questions were answered.     Assessment & Plan:     65 y.o. female with the following -     1. PAF (paroxysmal atrial fibrillation) (HCC) 1/2014  Chronic, well controlled with metoprolol.  Patient reports that she can " feel when she is in A. fib and has not had any episodes of it in many months.  She was evaluated by cardiology last year and did not need to follow-up with them.  Plan to continue with metoprolol as prescribed.  Patient did not need any refills today.     2. Abnormal CBC  Patient had an abnormal CBC with her last labs.  Ordering to evaluate status  - CBC WITH DIFFERENTIAL; Future    3. High risk medication use  CMP ordered  - Comp Metabolic Panel; Future    4. Vitamin D deficiency  Chronic, labs ordered.  - VITAMIN D,25 HYDROXY; Future    5. Dyslipidemia  Chronic, status unknown.  Currently taking atorvastatin 20 mg daily  - Lipid Profile; Future    6. Panic attack  Chronic.  Patient has a prescription for clonazepam 0.5 mg that she uses as needed for panic attacks.  Discussed that this is a medication that I do not feel comfortable prescribing given her age and that is a controlled substance it is habit-forming.  Discussed that I would not refill that medication in the future but I am happy to put in a referral to psychiatry if she would like to continue getting the prescription.     7. Hand weakness  This is a chronic problem.  Discussed that there are different causes for this including arthritis and/or neck problems.  Patient states that his not that bothersome for her she does not want to pursue a work-up    8. Essential hypertension  Chronic, well controlled.  Blood pressure in the office today is 106/68.  Plan is to continue with half a tablet metoprolol 25 mg    9. Obesity (BMI 35.0-39.9 without comorbidity) (HCC)  Chronic.  Patient counseled about the importance of healthy diet and regular exercise.  She has lost some weight with portion control, which I encouraged her to continue doing.   - TSH WITH REFLEX TO FT4; Future    10. Impaired fasting blood sugar  Patient had impaired fasting blood sugar last year.  Reordering labs to evaluate status  - HEMOGLOBIN A1C; Future    11. Tobacco abuse  Chronic.   Patient is a 25-pack-year smoking history. Patient is advised to stop using tobacco and tobacco products. We discussed abrupt cessation, nicotine gum or patches, medications such as bupropion or Chantix, and nicotine inhalers including electronic cigarettes.  Approximately 10 minutes was spent with the patient discussing related health consequences of tobacco use, nonpharmacologic and pharmacologic treatment modalities.  Patient reports that nicotine patches have been helpful in the past.  Sent in prescription  - nicotine (NICODERM) 7 MG/24HR PATCH 24 HR; Place 1 Patch on the skin every 24 hours.  Dispense: 30 Patch; Refill: 11    12. Postmenopausal status (age-related) (natural)  Ordered today  - DS-BONE DENSITY STUDY (DEXA)    13. Encounter for screening mammogram for breast cancer  Ordered today  - MA-SCREENING MAMMO BILAT W/TOMOSYNTHESIS W/CAD; Future    I spent a total of 38 minutes with record review (including external notes and labs), exam, communication with the patient, communication with other providers, and documentation of this encounter.     Return in about 6 months (around 10/1/2021).    Please note that this dictation was created using voice recognition software. I have made every reasonable attempt to correct obvious errors, but I expect that there are errors of grammar and possibly content that I did not discover before finalizing the note.    Electronically signed by Melody Bass PA-C on April 1, 2021

## 2021-04-01 NOTE — ASSESSMENT & PLAN NOTE
Currently rate controlled with metoprolol SR 25 mg.  Was seen by cardiology a year ago and had an echo that was normal and they did not need her to follow-up at that time. Can normally feel when she is in A fib and has not had any episodes in many months.

## 2021-05-20 ENCOUNTER — APPOINTMENT (OUTPATIENT)
Dept: RADIOLOGY | Facility: MEDICAL CENTER | Age: 66
End: 2021-05-20
Attending: PHYSICIAN ASSISTANT
Payer: MEDICARE

## 2021-08-24 ENCOUNTER — TELEPHONE (OUTPATIENT)
Dept: CARDIOLOGY | Facility: MEDICAL CENTER | Age: 66
End: 2021-08-24

## 2021-08-24 NOTE — TELEPHONE ENCOUNTER
----- Message from Gil Pinedo Ass't sent at 8/23/2021  2:34 PM PDT -----  Regarding: Follow-up  Patient is due for a follow up with CW, please contact patient to schedule an appointment for further refills of their medications.    Thank you,  Marlene Cordero Ass't

## 2021-08-30 DIAGNOSIS — I10 ESSENTIAL HYPERTENSION: ICD-10-CM

## 2021-08-30 DIAGNOSIS — I48.0 PAF (PAROXYSMAL ATRIAL FIBRILLATION) (HCC): ICD-10-CM

## 2021-08-30 RX ORDER — METOPROLOL SUCCINATE 25 MG/1
TABLET, EXTENDED RELEASE ORAL
Qty: 45 TABLET | Refills: 3 | Status: SHIPPED | OUTPATIENT
Start: 2021-08-30 | End: 2022-12-30

## 2021-11-16 DIAGNOSIS — E78.5 DYSLIPIDEMIA: ICD-10-CM

## 2021-11-18 RX ORDER — ATORVASTATIN CALCIUM 20 MG/1
TABLET, FILM COATED ORAL
Qty: 90 TABLET | Refills: 0 | OUTPATIENT
Start: 2021-11-18

## 2021-11-20 DIAGNOSIS — E78.5 DYSLIPIDEMIA: ICD-10-CM

## 2021-11-23 RX ORDER — ATORVASTATIN CALCIUM 20 MG/1
20 TABLET, FILM COATED ORAL EVERY EVENING
Qty: 30 TABLET | Refills: 0 | Status: SHIPPED | OUTPATIENT
Start: 2021-11-23 | End: 2022-01-27 | Stop reason: SDUPTHER

## 2021-11-23 NOTE — TELEPHONE ENCOUNTER
Refill Request  Gil Jean Ass't  You Yesterday (9:15 AM)     Please advise if ok to fill? Pt last seen 2/10/2020 RTC date: Return if symptoms worsen or fail to improve.    Message text      30 day courtesy refill sent.

## 2022-01-27 DIAGNOSIS — E78.5 DYSLIPIDEMIA: ICD-10-CM

## 2022-01-27 RX ORDER — ATORVASTATIN CALCIUM 20 MG/1
20 TABLET, FILM COATED ORAL EVERY EVENING
Qty: 15 TABLET | Refills: 0 | Status: SHIPPED | OUTPATIENT
Start: 2022-01-27 | End: 2022-04-29

## 2022-02-07 ENCOUNTER — APPOINTMENT (OUTPATIENT)
Dept: RADIOLOGY | Facility: MEDICAL CENTER | Age: 67
End: 2022-02-07
Attending: PHYSICIAN ASSISTANT
Payer: MEDICARE

## 2022-02-23 ENCOUNTER — APPOINTMENT (OUTPATIENT)
Dept: RADIOLOGY | Facility: MEDICAL CENTER | Age: 67
End: 2022-02-23
Attending: PHYSICIAN ASSISTANT
Payer: MEDICARE

## 2022-02-28 ENCOUNTER — OFFICE VISIT (OUTPATIENT)
Dept: MEDICAL GROUP | Facility: PHYSICIAN GROUP | Age: 67
End: 2022-02-28
Payer: MEDICARE

## 2022-02-28 VITALS
BODY MASS INDEX: 39.53 KG/M2 | HEIGHT: 66 IN | DIASTOLIC BLOOD PRESSURE: 70 MMHG | RESPIRATION RATE: 16 BRPM | WEIGHT: 246 LBS | OXYGEN SATURATION: 97 % | HEART RATE: 70 BPM | TEMPERATURE: 98.5 F | SYSTOLIC BLOOD PRESSURE: 110 MMHG

## 2022-02-28 DIAGNOSIS — R25.2 MUSCLE CRAMP: ICD-10-CM

## 2022-02-28 DIAGNOSIS — I10 ESSENTIAL HYPERTENSION: ICD-10-CM

## 2022-02-28 DIAGNOSIS — G56.03 BILATERAL CARPAL TUNNEL SYNDROME: ICD-10-CM

## 2022-02-28 DIAGNOSIS — I48.0 PAF (PAROXYSMAL ATRIAL FIBRILLATION) (HCC): Chronic | ICD-10-CM

## 2022-02-28 DIAGNOSIS — Z76.89 ENCOUNTER TO ESTABLISH CARE: ICD-10-CM

## 2022-02-28 DIAGNOSIS — F41.0 PANIC ATTACK: ICD-10-CM

## 2022-02-28 DIAGNOSIS — E66.9 OBESITY (BMI 35.0-39.9 WITHOUT COMORBIDITY): ICD-10-CM

## 2022-02-28 DIAGNOSIS — R29.898 HAND WEAKNESS: ICD-10-CM

## 2022-02-28 PROBLEM — M77.12 LEFT TENNIS ELBOW: Status: RESOLVED | Noted: 2017-05-10 | Resolved: 2022-02-28

## 2022-02-28 PROCEDURE — G0438 PPPS, INITIAL VISIT: HCPCS | Performed by: FAMILY MEDICINE

## 2022-02-28 RX ORDER — CLONAZEPAM 0.5 MG/1
TABLET ORAL
Qty: 20 TABLET | Refills: 0 | Status: SHIPPED | OUTPATIENT
Start: 2022-02-28 | End: 2022-07-28

## 2022-02-28 ASSESSMENT — PATIENT HEALTH QUESTIONNAIRE - PHQ9: CLINICAL INTERPRETATION OF PHQ2 SCORE: 0

## 2022-02-28 NOTE — PROGRESS NOTES
Subjective:     CC: Patient is here to establish care and discuss some of her health care concerns.    HPI:   [place] presents today with the following medical complaints:    Encounter to establish care  Patient is here today to establish care.  Her main problem is with migratory muscle cramps for the last few years.  She states magnesium does help but does not make it go away.  She is on a statin and has not tried stopping it.    Essential hypertension  This is a chronic problem.  Patient blood pressures well controlled on current medication which is also used to help prevent her atrial fibrillation.    Hand weakness  This is a chronic problem.  Patient's has weakness in her right hand particularly when trying to pinch something with her thumb and first finger.  Been present for at least a year.    Bilateral carpal tunnel syndrome  This is a chronic problem.  Patient has troubles with intermittent numbness to the thumb and first 2 fingers of the left and right hand.  Also weakness to pincher  of the right hand.    Panic attack  This is a chronic problem.  Patient is asking for refill on her clonazepam.  She got a bottle of 32 years ago and states still has half of it left but she wants to replace some is a rolled.  She states she has an attack about once every 3 months.    PAF (paroxysmal atrial fibrillation) ([facility]) 1/2014  This is a chronic problem.  She has been on metoprolol since 2014 to help control her proximal atrial fibrillation.  She is not on a blood thinner.    Obesity (BMI 35.0-39.9 without comorbidity) ([facility])  This is a chronic problem.  Patient does try to watch her weight.      Past Medical History:   Diagnosis Date   • Hyperlipidemia    • Incomplete right bundle branch block (RBBB)    • PAF (paroxysmal atrial fibrillation) ([facility]) 1/2014    • Tobacco abuse        Social History     Tobacco Use   • Smoking status: Current Every Day Smoker     Packs/day: 0.50     Years: 50.00     Pack years: 25.00      "Types: Cigarettes   • Smokeless tobacco: Never Used   • Tobacco comment: 4-5 cigarettes a day   Vaping Use   • Vaping Use: Never used   Substance Use Topics   • Alcohol use: No     Alcohol/week: 0.0 oz   • Drug use: No       Current Outpatient Medications Ordered in Epic   Medication Sig Dispense Refill   • clonazePAM (KLONOPIN) 0.5 MG Tab 1/2 to 1 po TID prn anxiety 20 Tablet 0   • atorvastatin (LIPITOR) 20 MG Tab Take 1 Tablet by mouth every evening. **LAST SEEN 2/2020.  TO ENSURE FURTHER REFILLS, CALL [phone] TO SCHEDULE FOLLOW UP VISIT.** 15 Tablet 0   • metoprolol SR (TOPROL XL) 25 MG TABLET SR 24 HR Take 1/2 (one-half) tablet by mouth once daily 45 Tablet 3   • Cholecalciferol (VITAMIN D3) 2000 UNIT Cap Take  by mouth.     • Potassium (POTASSIMIN PO) Take  by mouth.     • MAGNESIUM PO Take  by mouth.     • B Complex Vitamins (VITAMIN B COMPLEX PO) Take  by mouth.       No current Epic-ordered facility-administered medications on file.       Allergies:  Patient has no known allergies.    Health Maintenance: Completed    ROS:  Gen: no fevers/chills, no changes in weight  Eyes: no changes in vision  ENT: no sore throat, no hearing loss, no bloody nose  Pulm: no sob, no cough  CV: no chest pain, no palpitations  GI: no nausea/vomiting, no diarrhea  : no dysuria  MSk: no myalgias  Skin: no rash  Neuro: no headaches, no numbness/tingling  Heme/Lymph: no easy bruising      Objective:       Exam:  /70 (BP Location: Left arm, Patient Position: Sitting, BP Cuff Size: Adult)   Pulse 70   Temp 36.9 °C (98.5 °F) (Temporal)   Resp 16   Ht 1.676 m (5' 6\")   Wt 112 kg (246 lb)   SpO2 97%   Breastfeeding No   BMI 39.71 kg/m²  Body mass index is 39.71 kg/m².    Gen: Alert and oriented, No apparent distress.  Eyes:   Extraocular motions intact.  No scleral icterus seen.  Ears:    Ear canals and TMs are normal.  Neck: Neck is supple without lymphadenopathy.  Thyroid exam is normal.  Lungs: Normal effort, CTA " bilaterally, no wheezes, rhonchi, or rales  CV: Regular rate and rhythm. No murmurs, rubs, or gallops.  Abdomen: Soft, nontender, no organomegaly or masses normal bowel sounds.  Ext: No clubbing, cyanosis, edema.  Phalens and Tinel sign are negative.  She does have muscle atrophy in the right thenar eminence and weakness is a pincher .  Neuro: Cranial nerves II through VIII are grossly intact.  No lateralized signs are seen.  Gait is normal.      Labs: Reviewed    Assessment & Plan:     66 y.o. female with the following -     1. Encounter to establish care  Patient establish care with me today.  We will recheck her labs in April.  General health care concerns addressed.  Patient declines all immunizations.  Patient also encouraged to quit smoking.    2. Muscle cramp  This is a chronic problem.  Patient was told first to stop her atorvastatin and see if her symptoms resolve in the next 2 to 3 weeks.  If they do we can try Crestor.  If they do not then we will do appropriate lab work.    3. Bilateral carpal tunnel syndrome  This is a chronic problem.  Patient has symptoms suggestive of bilateral carpal tunnel syndrome.  Referral to orthopedics made.  - Referral to Orthopedics    4. Panic attack  This is a chronic problem.  Given that she uses this so infrequently I did refill her medications.  However she was told if she starts needing it more often she needs to return to clinic to discuss other appropriate treatment.  - clonazePAM (KLONOPIN) 0.5 MG Tab; 1/2 to 1 po TID prn anxiety  Dispense: 20 Tablet; Refill: 0    5. Hand weakness  This is a chronic problem.  Referral to orthopedics made.  - Referral to Orthopedics    6. Essential hypertension  This is a chronic problem.  Under good control.    7. PAF (paroxysmal atrial fibrillation) ([facility]) 1/2014  This is a chronic problem with no apparent recurrences.  Continue to follow.    8. Obesity (BMI 35.0-39.9 without comorbidity) ([facility])  This is a chronic problem.   Continue to work on weight reduction.      Return in about 1 year (around 2/28/2023) for annual exam.    Please note that this dictation was created using voice recognition software. I have made every reasonable attempt to correct obvious errors, but I expect that there are errors of grammar and possibly content that I did not discover before finalizing the note.

## 2022-02-28 NOTE — ASSESSMENT & PLAN NOTE
This is a chronic problem.  Patient has troubles with intermittent numbness to the thumb and first 2 fingers of the left and right hand.  Also weakness to pincher  of the right hand.

## 2022-02-28 NOTE — ASSESSMENT & PLAN NOTE
Patient is here today to establish care.  Her main problem is with migratory muscle cramps for the last few years.  She states magnesium does help but does not make it go away.  She is on a statin and has not tried stopping it.

## 2022-02-28 NOTE — ASSESSMENT & PLAN NOTE
This is a chronic problem.  Patient is asking for refill on her clonazepam.  She got a bottle of 32 years ago and states still has half of it left but she wants to replace some is a rolled.  She states she has an attack about once every 3 months.

## 2022-02-28 NOTE — ASSESSMENT & PLAN NOTE
This is a chronic problem.  She has been on metoprolol since 2014 to help control her proximal atrial fibrillation.  She is not on a blood thinner.

## 2022-02-28 NOTE — ASSESSMENT & PLAN NOTE
This is a chronic problem.  Patient blood pressures well controlled on current medication which is also used to help prevent her atrial fibrillation.

## 2022-02-28 NOTE — ASSESSMENT & PLAN NOTE
This is a chronic problem.  Patient's has weakness in her right hand particularly when trying to pinch something with her thumb and first finger.  Been present for at least a year.

## 2022-04-29 ENCOUNTER — OFFICE VISIT (OUTPATIENT)
Dept: MEDICAL GROUP | Facility: PHYSICIAN GROUP | Age: 67
End: 2022-04-29
Payer: MEDICARE

## 2022-04-29 VITALS
TEMPERATURE: 97.5 F | BODY MASS INDEX: 39.76 KG/M2 | RESPIRATION RATE: 18 BRPM | HEART RATE: 78 BPM | HEIGHT: 66 IN | SYSTOLIC BLOOD PRESSURE: 112 MMHG | DIASTOLIC BLOOD PRESSURE: 74 MMHG | WEIGHT: 247.4 LBS | OXYGEN SATURATION: 97 %

## 2022-04-29 DIAGNOSIS — R29.898 HAND WEAKNESS: ICD-10-CM

## 2022-04-29 DIAGNOSIS — I48.0 PAF (PAROXYSMAL ATRIAL FIBRILLATION) (HCC): Chronic | ICD-10-CM

## 2022-04-29 PROBLEM — Z76.89 ENCOUNTER TO ESTABLISH CARE: Status: RESOLVED | Noted: 2022-02-28 | Resolved: 2022-04-29

## 2022-04-29 PROCEDURE — 99213 OFFICE O/P EST LOW 20 MIN: CPT | Performed by: FAMILY MEDICINE

## 2022-04-29 RX ORDER — IBUPROFEN 800 MG/1
800 TABLET ORAL EVERY 8 HOURS PRN
COMMUNITY
Start: 2022-04-04 | End: 2022-12-23

## 2022-04-29 NOTE — ASSESSMENT & PLAN NOTE
This is a chronic problem.  We had referred the patient to orthopedics but she never read the note from the referral office in my chart telling her to call for an appointment.  I gave her a copy of that today so she can set up her appointment.

## 2022-04-29 NOTE — PROGRESS NOTES
Subjective:     CC: Here for palpitations.    HPI:   [place] presents today with the following medical concerns:    PAF (paroxysmal atrial fibrillation) ([facility]) 1/2014  This is a chronic problem.  Patient had neglected to take her metoprolol for about a week and started feeling some palpitations.  She restarted it 2 days ago and the symptoms have all resolved.  She is here today since her shoulder made her come in.    Hand weakness  This is a chronic problem.  We had referred the patient to orthopedics but she never read the note from the referral office in my chart telling her to call for an appointment.  I gave her a copy of that today so she can set up her appointment.      Past Medical History:   Diagnosis Date   • Hyperlipidemia    • Incomplete right bundle branch block (RBBB)    • PAF (paroxysmal atrial fibrillation) ([facility]) 1/2014    • Tobacco abuse        Social History     Tobacco Use   • Smoking status: Current Every Day Smoker     Packs/day: 0.50     Years: 50.00     Pack years: 25.00     Types: Cigarettes   • Smokeless tobacco: Never Used   • Tobacco comment: 4-5 cigarettes a day   Vaping Use   • Vaping Use: Never used   Substance Use Topics   • Alcohol use: No     Alcohol/week: 0.0 oz   • Drug use: No       Current Outpatient Medications Ordered in Epic   Medication Sig Dispense Refill   • ibuprofen (MOTRIN) 800 MG Tab Take 800 mg by mouth every 8 hours as needed. for pain     • Multiple Vitamins-Minerals (ZINC PO) Take  by mouth.     • clonazePAM (KLONOPIN) 0.5 MG Tab 1/2 to 1 po TID prn anxiety 20 Tablet 0   • metoprolol SR (TOPROL XL) 25 MG TABLET SR 24 HR Take 1/2 (one-half) tablet by mouth once daily 45 Tablet 3   • Cholecalciferol (VITAMIN D3) 2000 UNIT Cap Take  by mouth.     • Potassium (POTASSIMIN PO) Take  by mouth.     • MAGNESIUM PO Take  by mouth.     • B Complex Vitamins (VITAMIN B COMPLEX PO) Take  by mouth.       No current Epic-ordered facility-administered medications on file.  "      Allergies:  Patient has no known allergies.    Health Maintenance: Completed    ROS:  Gen: no fevers/chills, no changes in weight  Eyes: no changes in vision  ENT: no sore throat, no hearing loss, no bloody nose  Pulm: no sob, no cough  CV: no chest pain, no palpitations  GI: no nausea/vomiting, no diarrhea  : no dysuria  Skin: no rash  Neuro: no headaches, no numbness/tingling  Heme/Lymph: no easy bruising      Objective:       Exam:  /74 (BP Location: Right arm, Patient Position: Sitting, BP Cuff Size: Large adult)   Pulse 78   Temp 36.4 °C (97.5 °F) (Temporal)   Resp 18   Ht 1.676 m (5' 6\")   Wt 112 kg (247 lb 6.4 oz)   SpO2 97%   Breastfeeding No   BMI 39.93 kg/m²  Body mass index is 39.93 kg/m².    Gen: Alert and oriented, No apparent distress.  Lungs: Normal effort, CTA bilaterally, no wheezes, rhonchi, or rales  CV: Regular rate and rhythm. No murmurs, rubs, or gallops.  Ext: No clubbing, cyanosis, edema.        Assessment & Plan:     66 y.o. female with the following -     1. PAF (paroxysmal atrial fibrillation) ([facility]) 1/2014  This is a chronic problem.  Patient was told she needs to remain on her metoprolol to help prevent palpitations and recurrence of her atrial fibrillation.  Continue to monitor.    2. Hand weakness  This is a chronic problem.  Patient given the phone number and address to set up her orthopedic appointment      Return if symptoms worsen or fail to improve.    Please note that this dictation was created using voice recognition software. I have made every reasonable attempt to correct obvious errors, but I expect that there are errors of grammar and possibly content that I did not discover before finalizing the note.      "

## 2022-04-29 NOTE — ASSESSMENT & PLAN NOTE
This is a chronic problem.  Patient had neglected to take her metoprolol for about a week and started feeling some palpitations.  She restarted it 2 days ago and the symptoms have all resolved.  She is here today since her shoulder made her come in.

## 2022-12-23 ENCOUNTER — OFFICE VISIT (OUTPATIENT)
Dept: MEDICAL GROUP | Facility: PHYSICIAN GROUP | Age: 67
End: 2022-12-23
Payer: MEDICARE

## 2022-12-23 VITALS
SYSTOLIC BLOOD PRESSURE: 128 MMHG | OXYGEN SATURATION: 97 % | HEIGHT: 66 IN | HEART RATE: 76 BPM | WEIGHT: 242 LBS | TEMPERATURE: 98.4 F | BODY MASS INDEX: 38.89 KG/M2 | DIASTOLIC BLOOD PRESSURE: 74 MMHG | RESPIRATION RATE: 18 BRPM

## 2022-12-23 DIAGNOSIS — E66.9 OBESITY (BMI 35.0-39.9 WITHOUT COMORBIDITY): ICD-10-CM

## 2022-12-23 DIAGNOSIS — M54.16 LUMBAR RADICULOPATHY: ICD-10-CM

## 2022-12-23 DIAGNOSIS — E66.01 MORBID (SEVERE) OBESITY DUE TO EXCESS CALORIES (HCC): ICD-10-CM

## 2022-12-23 DIAGNOSIS — I10 ESSENTIAL HYPERTENSION: ICD-10-CM

## 2022-12-23 DIAGNOSIS — N18.31 STAGE 3A CHRONIC KIDNEY DISEASE: ICD-10-CM

## 2022-12-23 DIAGNOSIS — Z78.0 POSTMENOPAUSAL ESTROGEN DEFICIENCY: ICD-10-CM

## 2022-12-23 DIAGNOSIS — I48.0 PAF (PAROXYSMAL ATRIAL FIBRILLATION) (HCC): Chronic | ICD-10-CM

## 2022-12-23 DIAGNOSIS — Z12.31 ENCOUNTER FOR SCREENING MAMMOGRAM FOR MALIGNANT NEOPLASM OF BREAST: ICD-10-CM

## 2022-12-23 DIAGNOSIS — I45.10 INCOMPLETE RIGHT BUNDLE BRANCH BLOCK (RBBB): ICD-10-CM

## 2022-12-23 DIAGNOSIS — Z23 NEED FOR VACCINATION: ICD-10-CM

## 2022-12-23 DIAGNOSIS — E78.5 DYSLIPIDEMIA: ICD-10-CM

## 2022-12-23 PROCEDURE — G0008 ADMIN INFLUENZA VIRUS VAC: HCPCS | Performed by: FAMILY MEDICINE

## 2022-12-23 PROCEDURE — 99214 OFFICE O/P EST MOD 30 MIN: CPT | Mod: 25 | Performed by: FAMILY MEDICINE

## 2022-12-23 PROCEDURE — 90662 IIV NO PRSV INCREASED AG IM: CPT | Performed by: FAMILY MEDICINE

## 2022-12-23 NOTE — ASSESSMENT & PLAN NOTE
This is a chronic problem.  Patient has not had her labs checked in a couple years so will be ordered.  She was told avoid NSAIDs.  Her last GFR was 58.

## 2022-12-23 NOTE — PROGRESS NOTES
Subjective:     CC: Here for several issues.    HPI:   Rica presents today with the following medical concerns:    Lumbar radiculopathy  This is an acute problem.  Patient states over the last couple of months she has had problems with periodic pain from her low back down her right leg.  This seems to happen only when she is in her bed and certain positions.  Does not bother her during the day.  No weakness in her legs.  She has an old x-ray in 2005 that showed moderate degenerative lumbar spine disease and then with facet arthropathy.    Incomplete right bundle branch block (RBBB)  This is a chronic stable condition.  She has been evaluated by cardiology in the past.    Essential hypertension  This is a chronic problem.  Blood pressures well controlled on current medications.  She is due for labs and those will be ordered.    Dyslipidemia  This is a chronic problem.  Patient states she  stopped her statin as she does not want to take it.  Lab will be ordered.    Morbid (severe) obesity due to excess calories (HCC)  This is a chronic problem.  Patient does try to pay attention to her weight and diet.    Obesity (BMI 35.0-39.9 without comorbidity) (HCC)  This is a chronic problem.  Continue to follow.  Encourage weight reduction.    PAF (paroxysmal atrial fibrillation) ([facility]) 1/2014  This is a historical issue.  No evidence of recurrence for several years.    Stage 3a chronic kidney disease (HCC)  This is a chronic problem.  Patient has not had her labs checked in a couple years so will be ordered.  She was told avoid NSAIDs.  Her last GFR was 58.    Past Medical History:   Diagnosis Date    Hyperlipidemia     Incomplete right bundle branch block (RBBB)     PAF (paroxysmal atrial fibrillation) ([facility]) 1/2014     Tobacco abuse        Social History     Tobacco Use    Smoking status: Every Day     Packs/day: 0.50     Years: 50.00     Pack years: 25.00     Types: Cigarettes    Smokeless tobacco: Never    Tobacco comments:  "    4-5 cigarettes a day   Vaping Use    Vaping Use: Never used   Substance Use Topics    Alcohol use: No     Alcohol/week: 0.0 oz    Drug use: No       Current Outpatient Medications Ordered in Epic   Medication Sig Dispense Refill    Multiple Vitamins-Minerals (ZINC PO) Take  by mouth.      metoprolol SR (TOPROL XL) 25 MG TABLET SR 24 HR Take 1/2 (one-half) tablet by mouth once daily 45 Tablet 3    Cholecalciferol (VITAMIN D3) 2000 UNIT Cap Take  by mouth.      Potassium (POTASSIMIN PO) Take  by mouth.      MAGNESIUM PO Take  by mouth.      B Complex Vitamins (VITAMIN B COMPLEX PO) Take  by mouth.       No current Epic-ordered facility-administered medications on file.       Allergies:  Patient has no known allergies.    Health Maintenance: Completed    ROS:  Gen: no fevers/chills, no changes in weight  Eyes: no changes in vision  ENT: no sore throat, no hearing loss, no bloody nose  Pulm: no sob, no cough  CV: no chest pain, no palpitations  GI: no nausea/vomiting, no diarrhea  : no dysuria  MSk: no myalgias  Skin: no rash  Neuro: no headaches, no numbness/tingling  Heme/Lymph: no easy bruising      Objective:       Exam:  /74 (BP Location: Left arm, Patient Position: Sitting, BP Cuff Size: Adult)   Pulse 76   Temp 36.9 °C (98.4 °F) (Temporal)   Resp 18   Ht 1.676 m (5' 6\")   Wt 110 kg (242 lb)   SpO2 97%   BMI 39.06 kg/m²  Body mass index is 39.06 kg/m².    Gen: Alert and oriented, No apparent distress.  Ext: No clubbing, cyanosis, edema.  Neuro: Cranial nerves II through VIII are grossly intact.  No lateralized signs are seen.  Straight leg raise is negative.    Labs: Ordered    Assessment & Plan:     67 y.o. female with the following -     1. PAF (paroxysmal atrial fibrillation) (HCC) 1/2014  This is a chronic problem.  Follow for possible recurrence.  - TSH WITH REFLEX TO FT4; Future    2. Essential hypertension  This is a chronic stable condition.  Lab ordered.  - Comp Metabolic Panel; " Future  - Lipid Profile; Future  - URINALYSIS,CULTURE IF INDICATED; Future  - CBC WITH DIFFERENTIAL; Future  - ESTIMATED GFR; Future    3. Dyslipidemia  This is a chronic problem.  Lab ordered.  Continue to encourage healthy low-fat diet.  - Lipid Profile; Future    4. Encounter for screening mammogram for malignant neoplasm of breast  This is a screening issue.  Mammogram ordered.  - MA-SCREENING MAMMO BILAT W/TOMOSYNTHESIS W/CAD; Future    5. Need for vaccination  Patient's flu vaccine given today.  - INFLUENZA VACCINE, HIGH DOSE (65+ ONLY)    6. Postmenopausal estrogen deficiency  This is a screening issue.  DEXA scan ordered.  - DS-BONE DENSITY STUDY (DEXA); Future    7. Obesity (BMI 35.0-39.9 without comorbidity) (HCC)  This is a chronic problem.  Encourage weight reduction.  - TSH WITH REFLEX TO FT4; Future  - Patient identified as having weight management issue.  Appropriate orders and counseling given.    8. Lumbar radiculopathy  This is a new problem.  I discussed with her she most likely is having some periodic nerve impingement in certain positions.  Since it is so intermittent we will just monitor for now.  If it becomes more progressive we could try a Medrol Dosepak and if that is not helpful get an x-ray and MRI before further treatment.    9. Morbid (severe) obesity due to excess calories (HCC)  Is a chronic problem.  Encourage weight reduction.    10. Body mass index (BMI) 39.0-39.9, adult  This is a chronic problem.  Encourage weight reduction.    11. Incomplete right bundle branch block (RBBB)  This is a chronic stable condition.    12. Stage 3a chronic kidney disease (HCC)  This is a chronic problem.  Lab ordered.  Patient was told avoid NSAIDs.      Return in about 3 months (around 3/23/2023) for Long.    Please note that this dictation was created using voice recognition software. I have made every reasonable attempt to correct obvious errors, but I expect that there are errors of grammar and  possibly content that I did not discover before finalizing the note.

## 2022-12-23 NOTE — ASSESSMENT & PLAN NOTE
This is a chronic problem.  Patient states she  stopped her statin as she does not want to take it.  Lab will be ordered.

## 2022-12-23 NOTE — ASSESSMENT & PLAN NOTE
This is an acute problem.  Patient states over the last couple of months she has had problems with periodic pain from her low back down her right leg.  This seems to happen only when she is in her bed and certain positions.  Does not bother her during the day.  No weakness in her legs.  She has an old x-ray in 2005 that showed moderate degenerative lumbar spine disease and then with facet arthropathy.

## 2022-12-23 NOTE — ASSESSMENT & PLAN NOTE
This is a chronic problem.  Blood pressures well controlled on current medications.  She is due for labs and those will be ordered.

## 2022-12-29 DIAGNOSIS — I10 ESSENTIAL HYPERTENSION: ICD-10-CM

## 2022-12-29 DIAGNOSIS — I48.0 PAF (PAROXYSMAL ATRIAL FIBRILLATION) (HCC): ICD-10-CM

## 2022-12-30 RX ORDER — METOPROLOL SUCCINATE 25 MG/1
TABLET, EXTENDED RELEASE ORAL
Qty: 45 TABLET | Refills: 1 | Status: SHIPPED | OUTPATIENT
Start: 2022-12-30 | End: 2023-09-11

## 2023-02-03 ENCOUNTER — HOSPITAL ENCOUNTER (OUTPATIENT)
Dept: LAB | Facility: MEDICAL CENTER | Age: 68
End: 2023-02-03
Attending: FAMILY MEDICINE
Payer: MEDICARE

## 2023-02-03 DIAGNOSIS — E66.9 OBESITY (BMI 35.0-39.9 WITHOUT COMORBIDITY): ICD-10-CM

## 2023-02-03 DIAGNOSIS — I10 ESSENTIAL HYPERTENSION: ICD-10-CM

## 2023-02-03 DIAGNOSIS — I48.0 PAF (PAROXYSMAL ATRIAL FIBRILLATION) (HCC): Chronic | ICD-10-CM

## 2023-02-03 DIAGNOSIS — E78.5 DYSLIPIDEMIA: ICD-10-CM

## 2023-02-03 LAB
ALBUMIN SERPL BCP-MCNC: 4 G/DL (ref 3.2–4.9)
ALBUMIN/GLOB SERPL: 1.1 G/DL
ALP SERPL-CCNC: 99 U/L (ref 30–99)
ALT SERPL-CCNC: 13 U/L (ref 2–50)
ANION GAP SERPL CALC-SCNC: 11 MMOL/L (ref 7–16)
APPEARANCE UR: CLEAR
AST SERPL-CCNC: 15 U/L (ref 12–45)
BASOPHILS # BLD AUTO: 0.9 % (ref 0–1.8)
BASOPHILS # BLD: 0.05 K/UL (ref 0–0.12)
BILIRUB SERPL-MCNC: 0.4 MG/DL (ref 0.1–1.5)
BILIRUB UR QL STRIP.AUTO: NEGATIVE
BUN SERPL-MCNC: 16 MG/DL (ref 8–22)
CALCIUM ALBUM COR SERPL-MCNC: 9.2 MG/DL (ref 8.5–10.5)
CALCIUM SERPL-MCNC: 9.2 MG/DL (ref 8.5–10.5)
CHLORIDE SERPL-SCNC: 105 MMOL/L (ref 96–112)
CHOLEST SERPL-MCNC: 199 MG/DL (ref 100–199)
CO2 SERPL-SCNC: 26 MMOL/L (ref 20–33)
COLOR UR: YELLOW
CREAT SERPL-MCNC: 0.82 MG/DL (ref 0.5–1.4)
EOSINOPHIL # BLD AUTO: 0.14 K/UL (ref 0–0.51)
EOSINOPHIL NFR BLD: 2.4 % (ref 0–6.9)
ERYTHROCYTE [DISTWIDTH] IN BLOOD BY AUTOMATED COUNT: 45 FL (ref 35.9–50)
FASTING STATUS PATIENT QL REPORTED: NORMAL
GFR SERPLBLD CREATININE-BSD FMLA CKD-EPI: 77 ML/MIN/1.73 M 2
GLOBULIN SER CALC-MCNC: 3.5 G/DL (ref 1.9–3.5)
GLUCOSE SERPL-MCNC: 89 MG/DL (ref 65–99)
GLUCOSE UR STRIP.AUTO-MCNC: NEGATIVE MG/DL
HCT VFR BLD AUTO: 47.6 % (ref 37–47)
HDLC SERPL-MCNC: 41 MG/DL
HGB BLD-MCNC: 15.7 G/DL (ref 12–16)
IMM GRANULOCYTES # BLD AUTO: 0.02 K/UL (ref 0–0.11)
IMM GRANULOCYTES NFR BLD AUTO: 0.3 % (ref 0–0.9)
KETONES UR STRIP.AUTO-MCNC: NEGATIVE MG/DL
LDLC SERPL CALC-MCNC: 132 MG/DL
LEUKOCYTE ESTERASE UR QL STRIP.AUTO: NEGATIVE
LYMPHOCYTES # BLD AUTO: 1.77 K/UL (ref 1–4.8)
LYMPHOCYTES NFR BLD: 30.5 % (ref 22–41)
MCH RBC QN AUTO: 30.5 PG (ref 27–33)
MCHC RBC AUTO-ENTMCNC: 33 G/DL (ref 33.6–35)
MCV RBC AUTO: 92.4 FL (ref 81.4–97.8)
MICRO URNS: NORMAL
MONOCYTES # BLD AUTO: 0.48 K/UL (ref 0–0.85)
MONOCYTES NFR BLD AUTO: 8.3 % (ref 0–13.4)
NEUTROPHILS # BLD AUTO: 3.34 K/UL (ref 2–7.15)
NEUTROPHILS NFR BLD: 57.6 % (ref 44–72)
NITRITE UR QL STRIP.AUTO: NEGATIVE
NRBC # BLD AUTO: 0 K/UL
NRBC BLD-RTO: 0 /100 WBC
PH UR STRIP.AUTO: 5.5 [PH] (ref 5–8)
PLATELET # BLD AUTO: 242 K/UL (ref 164–446)
PMV BLD AUTO: 9.5 FL (ref 9–12.9)
POTASSIUM SERPL-SCNC: 4.6 MMOL/L (ref 3.6–5.5)
PROT SERPL-MCNC: 7.5 G/DL (ref 6–8.2)
PROT UR QL STRIP: NEGATIVE MG/DL
RBC # BLD AUTO: 5.15 M/UL (ref 4.2–5.4)
RBC UR QL AUTO: NEGATIVE
SODIUM SERPL-SCNC: 142 MMOL/L (ref 135–145)
SP GR UR STRIP.AUTO: 1.02
TRIGL SERPL-MCNC: 130 MG/DL (ref 0–149)
TSH SERPL DL<=0.005 MIU/L-ACNC: 2.72 UIU/ML (ref 0.38–5.33)
UROBILINOGEN UR STRIP.AUTO-MCNC: 0.2 MG/DL
WBC # BLD AUTO: 5.8 K/UL (ref 4.8–10.8)

## 2023-02-03 PROCEDURE — 84443 ASSAY THYROID STIM HORMONE: CPT

## 2023-02-03 PROCEDURE — 36415 COLL VENOUS BLD VENIPUNCTURE: CPT

## 2023-02-03 PROCEDURE — 80061 LIPID PANEL: CPT

## 2023-02-03 PROCEDURE — 81003 URINALYSIS AUTO W/O SCOPE: CPT

## 2023-02-03 PROCEDURE — 80053 COMPREHEN METABOLIC PANEL: CPT

## 2023-02-03 PROCEDURE — 85025 COMPLETE CBC W/AUTO DIFF WBC: CPT

## 2023-02-25 ENCOUNTER — OFFICE VISIT (OUTPATIENT)
Dept: URGENT CARE | Facility: PHYSICIAN GROUP | Age: 68
End: 2023-02-25
Payer: MEDICARE

## 2023-02-25 VITALS
DIASTOLIC BLOOD PRESSURE: 80 MMHG | RESPIRATION RATE: 16 BRPM | SYSTOLIC BLOOD PRESSURE: 114 MMHG | OXYGEN SATURATION: 91 % | HEIGHT: 66 IN | TEMPERATURE: 98.3 F | HEART RATE: 84 BPM | WEIGHT: 233 LBS | BODY MASS INDEX: 37.45 KG/M2

## 2023-02-25 DIAGNOSIS — R19.7 DIARRHEA OF PRESUMED INFECTIOUS ORIGIN: ICD-10-CM

## 2023-02-25 DIAGNOSIS — R10.9 ABDOMINAL CRAMPING: ICD-10-CM

## 2023-02-25 PROCEDURE — 99214 OFFICE O/P EST MOD 30 MIN: CPT | Performed by: NURSE PRACTITIONER

## 2023-02-25 RX ORDER — DICYCLOMINE HCL 20 MG
20 TABLET ORAL EVERY 6 HOURS
Qty: 120 TABLET | Refills: 0 | Status: SHIPPED | OUTPATIENT
Start: 2023-02-25 | End: 2023-07-07

## 2023-02-25 ASSESSMENT — FIBROSIS 4 INDEX: FIB4 SCORE: 1.19

## 2023-02-26 ENCOUNTER — HOSPITAL ENCOUNTER (OUTPATIENT)
Facility: MEDICAL CENTER | Age: 68
End: 2023-02-26
Attending: NURSE PRACTITIONER
Payer: MEDICARE

## 2023-02-26 PROCEDURE — 87899 AGENT NOS ASSAY W/OPTIC: CPT | Mod: 91

## 2023-02-26 PROCEDURE — 87329 GIARDIA AG IA: CPT

## 2023-02-26 PROCEDURE — 87493 C DIFF AMPLIFIED PROBE: CPT

## 2023-02-26 PROCEDURE — 87328 CRYPTOSPORIDIUM AG IA: CPT

## 2023-02-26 PROCEDURE — 87045 FECES CULTURE AEROBIC BACT: CPT

## 2023-02-27 DIAGNOSIS — R19.7 DIARRHEA OF PRESUMED INFECTIOUS ORIGIN: ICD-10-CM

## 2023-02-27 LAB
C DIFF DNA SPEC QL NAA+PROBE: NEGATIVE
C DIFF TOX GENS STL QL NAA+PROBE: NEGATIVE
G LAMBLIA+C PARVUM AG STL QL RAPID: NORMAL
SIGNIFICANT IND 70042: NORMAL
SITE SITE: NORMAL
SOURCE SOURCE: NORMAL

## 2023-02-28 LAB
E COLI SXT1+2 STL IA: NORMAL
SIGNIFICANT IND 70042: NORMAL
SITE SITE: NORMAL
SOURCE SOURCE: NORMAL

## 2023-02-28 ASSESSMENT — ENCOUNTER SYMPTOMS
DIZZINESS: 0
ARTHRALGIAS: 0
ABDOMINAL PAIN: 1
DIARRHEA: 1
NAUSEA: 0
MYALGIAS: 0
HEARTBURN: 0
SORE THROAT: 0
CHILLS: 0
BLOOD IN STOOL: 0
WEIGHT LOSS: 0
CONSTIPATION: 0
VOMITING: 0
BLOATING: 0
COUGH: 0
EYE PAIN: 0
SHORTNESS OF BREATH: 0
FLATUS: 0
FEVER: 0
SWEATS: 0
HEADACHES: 0

## 2023-02-28 NOTE — PROGRESS NOTES
Subjective:   Rica Barrera is a 67 y.o. female who presents for Diarrhea (X1week )      Diarrhea   This is a new problem. The current episode started in the past 7 days. The problem occurs 5 to 10 times per day. The problem has been gradually improving. The stool consistency is described as Watery. The patient states that diarrhea awakens her from sleep. Associated symptoms include abdominal pain. Pertinent negatives include no arthralgias, bloating, chills, coughing, fever, headaches, increased  flatus, myalgias, sweats, vomiting or weight loss. Nothing aggravates the symptoms. There are no known risk factors. She has tried increased fluids for the symptoms. The treatment provided no relief. There is no history of bowel resection, irritable bowel syndrome, a recent abdominal surgery or short gut syndrome.     Review of Systems   Constitutional:  Negative for chills, fever and weight loss.   HENT:  Negative for sore throat.    Eyes:  Negative for pain.   Respiratory:  Negative for cough and shortness of breath.    Cardiovascular:  Negative for chest pain.   Gastrointestinal:  Positive for abdominal pain and diarrhea. Negative for bloating, blood in stool, constipation, flatus, heartburn, melena, nausea and vomiting.   Genitourinary:  Negative for hematuria.   Musculoskeletal:  Negative for arthralgias and myalgias.   Skin:  Negative for rash.   Neurological:  Negative for dizziness and headaches.     Medications:    dicyclomine Tabs  MAGNESIUM PO  metoprolol SR Tb24  POTASSIMIN PO  VITAMIN B COMPLEX PO  Vitamin D3 Caps  ZINC PO    Allergies: Patient has no known allergies.    Problem List: Rica Barrera does not have any pertinent problems on file.    Surgical History:  Past Surgical History:   Procedure Laterality Date    PRIMARY C SECTION      VEIN STRIPPING         Past Social Hx: Rica Barrera  reports that she has been smoking cigarettes. She has a 25.00 pack-year smoking history. She has never  "used smokeless tobacco. She reports that she does not drink alcohol and does not use drugs.     Past Family Hx:  Rica Barrera family history includes Arrythmia in her brother; Cancer in her father and maternal grandmother; Cancer (age of onset: 41) in her sister; Dementia in her father; Diabetes in her father; Heart Disease in her mother; Hypertension in her mother.     Problem list, medications, and allergies reviewed by myself today in Epic.     Objective:     /80   Pulse 84   Temp 36.8 °C (98.3 °F) (Temporal)   Resp 16   Ht 1.676 m (5' 6\")   Wt 106 kg (233 lb)   SpO2 91%   BMI 37.61 kg/m²     Physical Exam  Vitals and nursing note reviewed.   Constitutional:       General: She is not in acute distress.     Appearance: She is well-developed. She is not ill-appearing or toxic-appearing.   HENT:      Head: Normocephalic and atraumatic.      Right Ear: Tympanic membrane and external ear normal.      Left Ear: Tympanic membrane and external ear normal.      Nose: Nose normal.      Right Sinus: No maxillary sinus tenderness or frontal sinus tenderness.      Left Sinus: No maxillary sinus tenderness or frontal sinus tenderness.      Mouth/Throat:      Mouth: Mucous membranes are moist.      Pharynx: Uvula midline. No posterior oropharyngeal erythema.      Tonsils: No tonsillar exudate or tonsillar abscesses.   Eyes:      General:         Right eye: No discharge.         Left eye: No discharge.      Conjunctiva/sclera: Conjunctivae normal.   Cardiovascular:      Rate and Rhythm: Normal rate.   Pulmonary:      Effort: Pulmonary effort is normal. No respiratory distress.      Breath sounds: Normal breath sounds.   Abdominal:      General: Bowel sounds are increased. There is no distension.      Tenderness: There is no abdominal tenderness. There is no right CVA tenderness, left CVA tenderness, guarding or rebound. Negative signs include Christensen's sign, Rovsing's sign, McBurney's sign, psoas sign and " obturator sign.   Musculoskeletal:         General: Normal range of motion.   Skin:     General: Skin is warm and dry.   Neurological:      General: No focal deficit present.      Mental Status: She is alert and oriented to person, place, and time. Mental status is at baseline.      Gait: Gait (gait at baseline) normal.   Psychiatric:         Judgment: Judgment normal.       Assessment/Plan:     Diagnosis and associated orders:     1. Diarrhea of presumed infectious origin  CULTURE STOOL    CRYPTO/GIARDIA RAPID ASSAY    C Diff by PCR rflx Toxin      2. Abdominal cramping  dicyclomine (BENTYL) 20 MG Tab         Comments/MDM:     I personally reviewed prior external notes and prior test results pertinent to today's visit.  Abdomen nontender without any rebound or guarding, increased bowel sounds, patient afebrile discussed infectious versus inflammatory we will obtain stool cultures for evaluation.  Discussed dietary modifications.  Discussed management options, risks and benefits, and alternatives to treatment plan agreed upon.   Red flags discussed and indications to immediately call 911 or present to the Emergency Department.   Supportive care, differential diagnoses, and indications for immediate follow-up discussed with patient.    Patient expresses understanding and agrees to plan. Patient denies any other questions or concerns.              Please note that this dictation was created using voice recognition software. I have made a reasonable attempt to correct obvious errors, but I expect that there are errors of grammar and possibly content that I did not discover before finalizing the note.    This note was electronically signed by Florentino MORTENSEN.

## 2023-03-01 LAB
BACTERIA STL CULT: NORMAL
C JEJUNI+C COLI AG STL QL: NORMAL
E COLI SXT1+2 STL IA: NORMAL
SIGNIFICANT IND 70042: NORMAL
SITE SITE: NORMAL
SOURCE SOURCE: NORMAL

## 2023-07-07 ENCOUNTER — OFFICE VISIT (OUTPATIENT)
Dept: MEDICAL GROUP | Facility: PHYSICIAN GROUP | Age: 68
End: 2023-07-07
Payer: MEDICARE

## 2023-07-07 VITALS
RESPIRATION RATE: 18 BRPM | HEART RATE: 82 BPM | OXYGEN SATURATION: 96 % | WEIGHT: 238 LBS | BODY MASS INDEX: 38.25 KG/M2 | SYSTOLIC BLOOD PRESSURE: 128 MMHG | TEMPERATURE: 98.4 F | DIASTOLIC BLOOD PRESSURE: 74 MMHG | HEIGHT: 66 IN

## 2023-07-07 DIAGNOSIS — I10 ESSENTIAL HYPERTENSION: ICD-10-CM

## 2023-07-07 DIAGNOSIS — G56.03 BILATERAL CARPAL TUNNEL SYNDROME: ICD-10-CM

## 2023-07-07 DIAGNOSIS — I48.0 PAF (PAROXYSMAL ATRIAL FIBRILLATION) (HCC): ICD-10-CM

## 2023-07-07 DIAGNOSIS — E78.5 DYSLIPIDEMIA: ICD-10-CM

## 2023-07-07 DIAGNOSIS — Z12.11 COLON CANCER SCREENING: ICD-10-CM

## 2023-07-07 DIAGNOSIS — Z12.31 ENCOUNTER FOR SCREENING MAMMOGRAM FOR MALIGNANT NEOPLASM OF BREAST: ICD-10-CM

## 2023-07-07 DIAGNOSIS — I45.10 INCOMPLETE RIGHT BUNDLE BRANCH BLOCK (RBBB): ICD-10-CM

## 2023-07-07 PROBLEM — N18.31 STAGE 3A CHRONIC KIDNEY DISEASE: Status: RESOLVED | Noted: 2022-12-23 | Resolved: 2023-07-07

## 2023-07-07 PROCEDURE — 3078F DIAST BP <80 MM HG: CPT | Performed by: FAMILY MEDICINE

## 2023-07-07 PROCEDURE — 99214 OFFICE O/P EST MOD 30 MIN: CPT | Performed by: FAMILY MEDICINE

## 2023-07-07 PROCEDURE — 3074F SYST BP LT 130 MM HG: CPT | Performed by: FAMILY MEDICINE

## 2023-07-07 PROCEDURE — 1170F FXNL STATUS ASSESSED: CPT | Performed by: FAMILY MEDICINE

## 2023-07-07 ASSESSMENT — FIBROSIS 4 INDEX: FIB4 SCORE: 1.19

## 2023-07-07 ASSESSMENT — PATIENT HEALTH QUESTIONNAIRE - PHQ9: CLINICAL INTERPRETATION OF PHQ2 SCORE: 0

## 2023-07-07 NOTE — ASSESSMENT & PLAN NOTE
Patient is here today for follow-up.  States she is doing fairly well since last seen.  Her labs were done in February and all look really good I went over those with her today.

## 2023-07-07 NOTE — ASSESSMENT & PLAN NOTE
This is a chronic problem.  Patient is trying to watch her diet and has lost a little from last year.

## 2023-07-07 NOTE — PROGRESS NOTES
Subjective:     CC: Here for follow-up and discuss some of her medical issues.    HPI:   Rica presents today with the following medical concerns:    Essential hypertension  Patient is here today for follow-up.  States she is doing fairly well since last seen.  Her labs were done in February and all look really good I went over those with her today.    Dyslipidemia  This is a chronic problem.  Patient was getting cramps from the atorvastatin so she stopped it.  She was to try over-the-counter red yeast rice and other supplements to see if they help before trying another medication.    Incomplete right bundle branch block (RBBB)  This is a chronic problem.  Continue to follow.    PAF (paroxysmal atrial fibrillation) ([facility]) 1/2014  This is a chronic problem.  She has been followed by cardiology.    Bilateral carpal tunnel syndrome  This is a chronic problem.  Patient never got into orthopedics and now needs a new referral.  She is experiencing progressive weakness particularly in her right hand.    BMI 38.0-38.9,adult  This is a chronic problem.  Patient is trying to watch her diet and has lost a little from last year.    Past Medical History:   Diagnosis Date    Hyperlipidemia     Incomplete right bundle branch block (RBBB)     PAF (paroxysmal atrial fibrillation) ([facility]) 1/2014     Tobacco abuse        Social History     Tobacco Use    Smoking status: Every Day     Packs/day: 0.50     Years: 50.00     Pack years: 25.00     Types: Cigarettes    Smokeless tobacco: Never    Tobacco comments:     4-5 cigarettes a day   Vaping Use    Vaping Use: Never used   Substance Use Topics    Alcohol use: No     Alcohol/week: 0.0 oz    Drug use: No       Current Outpatient Medications Ordered in Epic   Medication Sig Dispense Refill    metoprolol SR (TOPROL XL) 25 MG TABLET SR 24 HR Take 1/2 (one-half) tablet by mouth once daily 45 Tablet 1    Multiple Vitamins-Minerals (ZINC PO) Take  by mouth.      Cholecalciferol (VITAMIN D3) 2000  "UNIT Cap Take  by mouth.      Potassium (POTASSIMIN PO) Take  by mouth.      MAGNESIUM PO Take  by mouth.      B Complex Vitamins (VITAMIN B COMPLEX PO) Take  by mouth.       No current Epic-ordered facility-administered medications on file.       Allergies:  Patient has no known allergies.    Health Maintenance: Completed    ROS:  Gen: no fevers/chills, no changes in weight  Eyes: no changes in vision  ENT: no sore throat, no hearing loss, no bloody nose  Pulm: no sob, no cough  CV: no chest pain, no palpitations  GI: no nausea/vomiting, no diarrhea  : no dysuria  MSk: no myalgias  Skin: no rash  Neuro: no headaches, no numbness/tingling  Heme/Lymph: no easy bruising      Objective:       Exam:  /74 (BP Location: Left arm, Patient Position: Sitting, BP Cuff Size: Large adult)   Pulse 82   Temp 36.9 °C (98.4 °F) (Temporal)   Resp 18   Ht 1.676 m (5' 6\")   Wt 108 kg (238 lb)   SpO2 96%   BMI 38.41 kg/m²  Body mass index is 38.41 kg/m².    Gen: Alert and oriented, No apparent distress.  Eyes: Extraocular motions intact.  No scleral icterus seen.  She does have drooping of the left eye lid which is chronic  Ears:    Ear canals and TMs are clear.  Neck: Neck is supple without lymphadenopathy.  Thyroid exam is normal.  Lungs: Normal effort, CTA bilaterally, no wheezes, rhonchi, or rales  CV: Regular rate and rhythm. No murmurs, rubs, or gallops.  No carotid bruits heard.  Abdomen: Soft, nontender, no organomegaly or masses.  Normal bowel sounds.  Ext: No clubbing, cyanosis, edema.  Atrophy is seen to the muscles of the right hand.  Neuro: Cranial nerves II through VIII are grossly intact.      Labs: Reviewed with patient.  Cologuard ordered.    Assessment & Plan:     67 y.o. female with the following -     1. Encounter for screening mammogram for malignant neoplasm of breast  This is a screening issue.  Mammogram ordered.  - MA-SCREENING MAMMO BILAT W/TOMOSYNTHESIS W/CAD; Future    2. Colon cancer " screening  This is a screening issue.  Cologuard ordered.  - COLOGUARD (FIT DNA)    3. Bilateral carpal tunnel syndrome  This is a chronic problem.  Referral made back to orthopedics so they can evaluate her for possible correction.  - Referral to Orthopedics    4. Incomplete right bundle branch block (RBBB)  This is a chronic problem.  Continue to follow.    5. PAF (paroxysmal atrial fibrillation) (HCC)  This is a chronic problem.  Continue care through cardiology.    6. Dyslipidemia  This is a chronic problem.  Patient is good to try over-the-counter supplements first and let me know when she like to recheck her lipid level.  If it goes back up we can try her on rosuvastatin next.    7. Essential hypertension  This is a chronic stable condition.  Continue current medications.    8. BMI 38.0-38.9,adult  This is a chronic problem.  Continue to encourage weight reduction.  - Patient identified as having weight management issue.  Appropriate orders and counseling given.    Patient is also still getting periodic episodes of pain down her right leg.  I had discussed with her in the past when she is ready we can get an x-ray and MRI of her lumbar spine but she does not want to do that as of yet.    HCC Gap Form    Comorbidity Diagnosis: Incomplete right bundle branch block (RBBB)  Assessment and plan: Chronic, stable. Encouraged healthy diet and physical activity changes with a goal of weight loss. Follow up at least annually. The comorbid condition is stable based on today's assessment. Continue current treatment plan. Follow up at least yearly.  Diagnosis: I48.0 - PAF (paroxysmal atrial fibrillation) (HCC)  Assessment and plan: Chronic, stable, as based on today's assessment and impact on other conditions evaluated today. Continue with current treatment plan: Managed by cardiology.  Follow-up with specialist as directed, but at least annually.  Last edited 07/07/23 15:42 PDT by Marc Fields III, M.D.         Return  in about 1 year (around 7/7/2024) for Long.    Please note that this dictation was created using voice recognition software. I have made every reasonable attempt to correct obvious errors, but I expect that there are errors of grammar and possibly content that I did not discover before finalizing the note.

## 2023-07-07 NOTE — ASSESSMENT & PLAN NOTE
This is a chronic problem.  Patient never got into orthopedics and now needs a new referral.  She is experiencing progressive weakness particularly in her right hand.

## 2023-07-07 NOTE — ASSESSMENT & PLAN NOTE
This is a chronic problem.  Patient was getting cramps from the atorvastatin so she stopped it.  She was to try over-the-counter red yeast rice and other supplements to see if they help before trying another medication.

## 2023-08-02 ENCOUNTER — HOSPITAL ENCOUNTER (OUTPATIENT)
Dept: RADIOLOGY | Facility: MEDICAL CENTER | Age: 68
End: 2023-08-02
Attending: FAMILY MEDICINE
Payer: MEDICARE

## 2023-08-02 DIAGNOSIS — Z78.0 POSTMENOPAUSAL ESTROGEN DEFICIENCY: ICD-10-CM

## 2023-08-02 PROCEDURE — 77080 DXA BONE DENSITY AXIAL: CPT

## 2023-09-06 PROBLEM — G56.01 RIGHT CARPAL TUNNEL SYNDROME: Status: ACTIVE | Noted: 2023-09-06

## 2023-09-06 PROBLEM — G56.21 CUBITAL TUNNEL SYNDROME, RIGHT: Status: ACTIVE | Noted: 2023-09-06

## 2023-09-11 DIAGNOSIS — I48.0 PAF (PAROXYSMAL ATRIAL FIBRILLATION) (HCC): ICD-10-CM

## 2023-09-11 DIAGNOSIS — I10 ESSENTIAL HYPERTENSION: ICD-10-CM

## 2023-09-11 RX ORDER — METOPROLOL SUCCINATE 25 MG/1
TABLET, EXTENDED RELEASE ORAL
Qty: 45 TABLET | Refills: 3 | Status: SHIPPED | OUTPATIENT
Start: 2023-09-11

## 2023-11-07 ENCOUNTER — TELEPHONE (OUTPATIENT)
Dept: HEALTH INFORMATION MANAGEMENT | Facility: OTHER | Age: 68
End: 2023-11-07

## 2024-01-23 ENCOUNTER — APPOINTMENT (OUTPATIENT)
Dept: RADIOLOGY | Facility: IMAGING CENTER | Age: 69
End: 2024-01-23
Attending: NURSE PRACTITIONER
Payer: MEDICARE

## 2024-01-23 ENCOUNTER — OFFICE VISIT (OUTPATIENT)
Dept: URGENT CARE | Facility: PHYSICIAN GROUP | Age: 69
End: 2024-01-23
Payer: MEDICARE

## 2024-01-23 VITALS
HEART RATE: 95 BPM | SYSTOLIC BLOOD PRESSURE: 120 MMHG | DIASTOLIC BLOOD PRESSURE: 78 MMHG | BODY MASS INDEX: 38.57 KG/M2 | HEIGHT: 66 IN | WEIGHT: 240 LBS | RESPIRATION RATE: 18 BRPM | TEMPERATURE: 97.2 F | OXYGEN SATURATION: 95 %

## 2024-01-23 DIAGNOSIS — M25.561 ACUTE PAIN OF RIGHT KNEE: ICD-10-CM
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
## 2024-01-23 PROCEDURE — 73564 X-RAY EXAM KNEE 4 OR MORE: CPT | Mod: TC,FY,RT | Performed by: NURSE PRACTITIONER

## 2024-01-23 PROCEDURE — 99213 OFFICE O/P EST LOW 20 MIN: CPT | Performed by: NURSE PRACTITIONER

## 2024-01-23 PROCEDURE — 3078F DIAST BP <80 MM HG: CPT | Performed by: NURSE PRACTITIONER

## 2024-01-23 PROCEDURE — 1170F FXNL STATUS ASSESSED: CPT | Performed by: NURSE PRACTITIONER

## 2024-01-23 PROCEDURE — 3074F SYST BP LT 130 MM HG: CPT | Performed by: NURSE PRACTITIONER

## 2024-01-23 PROCEDURE — 1125F AMNT PAIN NOTED PAIN PRSNT: CPT | Performed by: NURSE PRACTITIONER

## 2024-01-23 RX ORDER — AMOXICILLIN 500 MG/1
CAPSULE ORAL
COMMUNITY
Start: 2023-11-03 | End: 2024-01-30

## 2024-01-23 ASSESSMENT — ENCOUNTER SYMPTOMS: FALLS: 1

## 2024-01-23 ASSESSMENT — PAIN SCALES - GENERAL: PAINLEVEL: 5=MODERATE PAIN

## 2024-01-23 ASSESSMENT — FIBROSIS 4 INDEX: FIB4 SCORE: 1.2

## 2024-01-23 NOTE — PROGRESS NOTES
Subjective:     Rica Barrera is a 68 y.o. female who presents for Fall (Tripped over pillow x 3 days. Rt knee Swollen and painful to touch. Can walk but with some pain. )      Fall      Pt presents for evaluation of a new problem. Rica is a very pleasant 68-year-old female presents to urgent care today with complaints of right-sided knee pain and swelling.  3 days ago she unfortunately tripped over a pillow in her home and landed on the carpet directly on her right knee.  She does have significant pain when it is touched.  She is walking with a limp.  No previous right-sided knee injury.  She did try applying a brace however, this was very painful and did not give any relief.  She has used ice a few times.    Review of Systems   Musculoskeletal:  Positive for falls and joint pain.       PMH:   Past Medical History:   Diagnosis Date    Hyperlipidemia     Incomplete right bundle branch block (RBBB)     PAF (paroxysmal atrial fibrillation) ([facility]) 1/2014     Tobacco abuse      ALLERGIES: No Known Allergies  SURGHX:   Past Surgical History:   Procedure Laterality Date    PRIMARY C SECTION      VEIN STRIPPING       SOCHX:   Social History     Socioeconomic History    Marital status:    Tobacco Use    Smoking status: Every Day     Current packs/day: 0.50     Average packs/day: 0.5 packs/day for 50.0 years (25.0 ttl pk-yrs)     Types: Cigarettes    Smokeless tobacco: Never    Tobacco comments:     4-5 cigarettes a day   Vaping Use    Vaping Use: Never used   Substance and Sexual Activity    Alcohol use: No     Alcohol/week: 0.0 oz    Drug use: No    Sexual activity: Yes     Partners: Male     Comment: - Efrain     FH:   Family History   Problem Relation Age of Onset    Heart Disease Mother     Hypertension Mother     Dementia Father     Cancer Father         spine    Diabetes Father     Cancer Sister 41        breast    Cancer Maternal Grandmother         leukemia    Arrythmia Brother         WPW          "Objective:   /78   Pulse 95   Temp 36.2 °C (97.2 °F) (Temporal)   Resp 18   Ht 1.676 m (5' 6\")   Wt 109 kg (240 lb)   SpO2 95%   BMI 38.74 kg/m²     Physical Exam  Vitals and nursing note reviewed.   Constitutional:       General: She is not in acute distress.     Appearance: Normal appearance. She is normal weight. She is not ill-appearing or toxic-appearing.   HENT:      Head: Normocephalic.      Right Ear: External ear normal.      Left Ear: External ear normal.      Nose: No congestion or rhinorrhea.      Mouth/Throat:      Pharynx: No oropharyngeal exudate or posterior oropharyngeal erythema.   Eyes:      General:         Right eye: No discharge.         Left eye: No discharge.      Pupils: Pupils are equal, round, and reactive to light.   Pulmonary:      Effort: Pulmonary effort is normal.   Abdominal:      General: Abdomen is flat.   Musculoskeletal:         General: Normal range of motion.      Cervical back: Normal range of motion and neck supple.      Right knee: Swelling, effusion, ecchymosis, bony tenderness and crepitus present. No deformity or erythema. Normal range of motion. Tenderness present over the medial joint line and patellar tendon. No LCL laxity or MCL laxity. Normal meniscus and normal patellar mobility.   Skin:     General: Skin is dry.   Neurological:      General: No focal deficit present.      Mental Status: She is alert and oriented to person, place, and time. Mental status is at baseline.   Psychiatric:         Mood and Affect: Mood normal.         Behavior: Behavior normal.         Thought Content: Thought content normal.         Judgment: Judgment normal.       DX-KNEE COMPLETE 4+ RIGHT    Result Date: 1/23/2024 1/23/2024 12:06 PM HISTORY/REASON FOR EXAM: Pain/Deformity Following Trauma. Pain. Pain with ambulation especially suprapatellar region TECHNIQUE/EXAM DESCRIPTION AND NUMBER OF VIEWS: 4 views of the RIGHT knee. COMPARISON: None FINDINGS: No joint effusion is " seen. No displaced fracture is visualized. There is no subluxation. Minimal patellofemoral marginal spurring and lateral joint space narrowing Subcutaneous varicose veins are most pronounced laterally     No radiographic evidence of acute displaced fracture Mild patellofemoral osteoarthritis Varicose veins     Assessment/Plan:   Assessment    1. Fall, initial encounter  DX-KNEE COMPLETE 4+ RIGHT    Referral to Sports Medicine      2. Acute pain of right knee  DX-KNEE COMPLETE 4+ RIGHT    Referral to Sports Medicine        Patient declines knee brace in clinic today.  We did discuss benefits of icing, resting and elevating.  Ibuprofen/Tylenol as needed for relief of discomfort.  X-ray results discussed and were negative for fracture or dislocation.  She was referred to follow-up with sports medicine if symptoms remain persistent.  She is in agreement with plan of care.

## 2024-01-26 ENCOUNTER — TELEPHONE (OUTPATIENT)
Dept: HEALTH INFORMATION MANAGEMENT | Facility: OTHER | Age: 69
End: 2024-01-26

## 2024-01-30 ENCOUNTER — OFFICE VISIT (OUTPATIENT)
Dept: MEDICAL GROUP | Facility: PHYSICIAN GROUP | Age: 69
End: 2024-01-30
Payer: MEDICARE

## 2024-01-30 VITALS
DIASTOLIC BLOOD PRESSURE: 68 MMHG | WEIGHT: 241.6 LBS | BODY MASS INDEX: 38.83 KG/M2 | RESPIRATION RATE: 18 BRPM | HEART RATE: 84 BPM | SYSTOLIC BLOOD PRESSURE: 122 MMHG | OXYGEN SATURATION: 95 % | HEIGHT: 66 IN | TEMPERATURE: 98.6 F

## 2024-01-30 DIAGNOSIS — J01.00 SUBACUTE MAXILLARY SINUSITIS: ICD-10-CM

## 2024-01-30 DIAGNOSIS — E66.01 MORBID (SEVERE) OBESITY DUE TO EXCESS CALORIES (HCC): ICD-10-CM

## 2024-01-30 DIAGNOSIS — I48.0 PAF (PAROXYSMAL ATRIAL FIBRILLATION) (HCC): ICD-10-CM

## 2024-01-30 DIAGNOSIS — Z72.0 TOBACCO USE: ICD-10-CM

## 2024-01-30 DIAGNOSIS — I45.10 INCOMPLETE RIGHT BUNDLE BRANCH BLOCK (RBBB): ICD-10-CM

## 2024-01-30 PROCEDURE — 3074F SYST BP LT 130 MM HG: CPT | Performed by: FAMILY MEDICINE

## 2024-01-30 PROCEDURE — 3078F DIAST BP <80 MM HG: CPT | Performed by: FAMILY MEDICINE

## 2024-01-30 PROCEDURE — 1170F FXNL STATUS ASSESSED: CPT | Performed by: FAMILY MEDICINE

## 2024-01-30 PROCEDURE — 99213 OFFICE O/P EST LOW 20 MIN: CPT | Performed by: FAMILY MEDICINE

## 2024-01-30 RX ORDER — AMOXICILLIN AND CLAVULANATE POTASSIUM 875; 125 MG/1; MG/1
1 TABLET, FILM COATED ORAL 2 TIMES DAILY
Qty: 20 TABLET | Refills: 0 | Status: SHIPPED | OUTPATIENT
Start: 2024-01-30 | End: 2024-02-23

## 2024-01-30 ASSESSMENT — PATIENT HEALTH QUESTIONNAIRE - PHQ9: CLINICAL INTERPRETATION OF PHQ2 SCORE: 0

## 2024-01-30 ASSESSMENT — FIBROSIS 4 INDEX: FIB4 SCORE: 1.2

## 2024-01-30 NOTE — ASSESSMENT & PLAN NOTE
This is a chronic problem.  Patient does not take blood thinners and symptoms are controlled on her current medications.

## 2024-01-30 NOTE — ASSESSMENT & PLAN NOTE
This is a chronic problem.  I did talk to her about lung cancer screening program and she would like to be referred.

## 2024-01-30 NOTE — PROGRESS NOTES
Subjective:     CC: Here for enlarged lymph node on the right side of her neck along with other issues.    HPI:   Rica presents today with the following medical concerns:    Subacute maxillary sinusitis  This is a new problem.  Patient was set up to have surgery for carpal tunnel syndrome today but during the evaluation by the anesthesiologist she was found to have an enlarged right cervical node.  This surgery was canceled until she is evaluated and treated.  She states she is been having some sinus congestion the last several days.  She states the gland was bigger a couple days ago and has gone down.  Denies any sore throat, fever, cough or chills.    PAF (paroxysmal atrial fibrillation) ([facility]) 1/2014  This is a chronic problem.  Patient does not take blood thinners and symptoms are controlled on her current medications.    Incomplete right bundle branch block (RBBB)  This is a chronic problem.  Noted on previous EKGs.    Morbid (severe) obesity due to excess calories ([facility])  This is a chronic problem.  Continue to encourage weight reduction.    Tobacco use  This is a chronic problem.  I did talk to her about lung cancer screening program and she would like to be referred.    Past Medical History:   Diagnosis Date    Anesthesia     Hyperlipidemia     Hypertension     Incomplete right bundle branch block (RBBB)     PAF (paroxysmal atrial fibrillation) ([facility]) 1/2014     PONV (postoperative nausea and vomiting)     Sleep apnea     Tobacco abuse        Social History     Tobacco Use    Smoking status: Every Day     Current packs/day: 1.00     Average packs/day: 0.7 packs/day for 80.0 years (55.0 ttl pk-yrs)     Types: Cigarettes     Start date: 1/30/1994    Smokeless tobacco: Never    Tobacco comments:     4-5 cigarettes a day   Vaping Use    Vaping Use: Never used   Substance Use Topics    Alcohol use: No     Alcohol/week: 0.0 oz    Drug use: No       Current Outpatient Medications Ordered in Epic   Medication Sig  "Dispense Refill    amoxicillin-clavulanate (AUGMENTIN) 875-125 MG Tab Take 1 Tablet by mouth 2 times a day. 20 Tablet 0    oxyCODONE immediate-release (ROXICODONE) 5 MG Tab Take 1 Tablet by mouth every four hours as needed for Severe Pain for up to 3 days. 8 Tablet 0    metoprolol SR (TOPROL XL) 25 MG TABLET SR 24 HR Take 1/2 (one-half) tablet by mouth once daily 45 Tablet 3    Cholecalciferol (VITAMIN D3) 2000 UNIT Cap Take  by mouth.      Potassium (POTASSIMIN PO) Take  by mouth.      MAGNESIUM PO Take  by mouth.      B Complex Vitamins (VITAMIN B COMPLEX PO) Take  by mouth.       Current Facility-Administered Medications Ordered in Epic   Medication Dose Route Frequency Provider Last Rate Last Admin    lidocaine (XYLOCAINE) 1%  injection  0.5 mL Intradermal Pre-Op Once PRN Jian Lozano D.O.        lactated ringers infusion   Intravenous Continuous SANDRA Jhaveri.OMilena        ceFAZolin (Ancef) injection 2 g  2 g Intravenous Once Remedios Reyes M.D.           Allergies:  Patient has no known allergies.    Health Maintenance: Completed    ROS:  Gen: no fevers/chills, no changes in weight  Eyes: no changes in vision  ENT: no sore throat, no hearing loss, no bloody nose  Pulm: no sob, no cough  CV: no chest pain, no palpitations  GI: no nausea/vomiting, no diarrhea  : no dysuria  MSk: no myalgias  Skin: no rash  Neuro: no headaches, no numbness/tingling  Heme/Lymph: no easy bruising      Objective:       Exam:  /68 (BP Location: Left arm, Patient Position: Sitting, BP Cuff Size: Adult)   Pulse 84   Temp 37 °C (98.6 °F) (Temporal)   Resp 18   Ht 1.676 m (5' 6\")   Wt 110 kg (241 lb 9.6 oz)   SpO2 95%   BMI 39.00 kg/m²  Body mass index is 39 kg/m².    Gen: Alert and oriented, No apparent distress.  ENT:    Ear canals and TMs are clear.  Nose has mild to moderate congestion more on the right than on the left.  A little whitish drainage is seen.  Throat is clear.  No lesions are seen in the " oropharynx.  Neck: Neck is supple.  She does have an enlarged nontender soft right anterior cervical node.  The preauricular node is unremarkable.  The left side of her neck does not have any enlargement of lymph nodes.  Lungs: Normal effort, CTA bilaterally, no wheezes, rhonchi, or rales  Ext: No clubbing, cyanosis, edema.        Assessment & Plan:     68 y.o. female with the following -     1. Subacute maxillary sinusitis  This is a new issue.  I suspect she has a low-grade subacute sinusitis.  Will treat her with Augmentin and see if her lymph node resolves.  She is told if it is not better in 1 week or not resolved by then to let me know.    2. Tobacco use  This is a chronic problem.  Referral made.  - REFERRAL TO LUNG CANCER SCREENING PROGRAM    3. Morbid (severe) obesity due to excess calories (HCC)  This is a chronic problem.  Continue courage weight reduction.    4. Body mass index (BMI) 39.0-39.9, adult  This is a chronic problem.    5. Incomplete right bundle branch block (RBBB)  This is a chronic stable condition.    6. PAF (paroxysmal atrial fibrillation) (HCC)  This is a chronic problem under good control on current medications.  Patient declines a anticoagulant.    HCC Gap Form    Diagnosis: E66.01 - Morbid (severe) obesity due to excess calories (HCC)  Z68.39 - Body mass index (BMI) 39.0-39.9, adult  Comorbidity Diagnosis: Incomplete right bundle branch block (RBBB)  The current BMI is 39.00 kg/m2 as of 01/30/24 13:30 PST  Assessment and plan: Chronic, stable. Encouraged healthy diet and physical activity changes with a goal of weight loss. Follow up at least annually. The comorbid condition is stable based on today's assessment. Continue current treatment plan including control of risk factors. Follow up at least yearly.  Diagnosis to address: I48.0 - PAF (paroxysmal atrial fibrillation) (HCC)  Assessment and plan: Chronic, stable. Continue with current defined treatment plan: stable. Follow-up at  least annually.  Last edited 01/30/24 13:31 PST by Marc Fields III, M.D.         I did talk to patient about her mammogram but she declines that as well.  Return if symptoms worsen or fail to improve.    Please note that this dictation was created using voice recognition software. I have made every reasonable attempt to correct obvious errors, but I expect that there are errors of grammar and possibly content that I did not discover before finalizing the note.

## 2024-01-30 NOTE — ASSESSMENT & PLAN NOTE
This is a new problem.  Patient was set up to have surgery for carpal tunnel syndrome today but during the evaluation by the anesthesiologist she was found to have an enlarged right cervical node.  This surgery was canceled until she is evaluated and treated.  She states she is been having some sinus congestion the last several days.  She states the gland was bigger a couple days ago and has gone down.  Denies any sore throat, fever, cough or chills.

## 2024-02-02 ENCOUNTER — TELEPHONE (OUTPATIENT)
Dept: HEALTH INFORMATION MANAGEMENT | Facility: OTHER | Age: 69
End: 2024-02-02

## 2024-02-02 NOTE — TELEPHONE ENCOUNTER
Outcome: Left Message    Please transfer to Patient Outreach Team at [phone] when patient returns call.      Attempt # 1

## 2024-02-09 NOTE — TELEPHONE ENCOUNTER
Outcome: Left Message     Please transfer to Patient Outreach Team at [phone] when patient returns call.        Attempt # 2

## 2024-02-23 ENCOUNTER — OFFICE VISIT (OUTPATIENT)
Dept: URGENT CARE | Facility: PHYSICIAN GROUP | Age: 69
End: 2024-02-23
Payer: MEDICARE

## 2024-02-23 VITALS
SYSTOLIC BLOOD PRESSURE: 130 MMHG | DIASTOLIC BLOOD PRESSURE: 82 MMHG | RESPIRATION RATE: 14 BRPM | BODY MASS INDEX: 38.25 KG/M2 | HEIGHT: 66 IN | WEIGHT: 238 LBS | HEART RATE: 74 BPM | TEMPERATURE: 97.8 F | OXYGEN SATURATION: 98 %

## 2024-02-23 DIAGNOSIS — M70.41 PREPATELLAR BURSITIS OF RIGHT KNEE: ICD-10-CM

## 2024-02-23 DIAGNOSIS — R22.1 NECK SWELLING: ICD-10-CM

## 2024-02-23 PROCEDURE — 3075F SYST BP GE 130 - 139MM HG: CPT | Performed by: PHYSICIAN ASSISTANT

## 2024-02-23 PROCEDURE — 99214 OFFICE O/P EST MOD 30 MIN: CPT | Performed by: PHYSICIAN ASSISTANT

## 2024-02-23 PROCEDURE — 3079F DIAST BP 80-89 MM HG: CPT | Performed by: PHYSICIAN ASSISTANT

## 2024-02-23 PROCEDURE — 1170F FXNL STATUS ASSESSED: CPT | Performed by: PHYSICIAN ASSISTANT

## 2024-02-23 ASSESSMENT — ENCOUNTER SYMPTOMS
CARDIOVASCULAR NEGATIVE: 1
GASTROINTESTINAL NEGATIVE: 1
NEUROLOGICAL NEGATIVE: 1
CONSTITUTIONAL NEGATIVE: 1
FALLS: 1
SORE THROAT: 0
RESPIRATORY NEGATIVE: 1

## 2024-02-23 ASSESSMENT — FIBROSIS 4 INDEX: FIB4 SCORE: 1.2

## 2024-02-23 NOTE — PROGRESS NOTES
Subjective     Crestone Marii Barrera is a very pleasant 68 y.o. female who presents with Knee Swelling (Pt states it was getting better from the fall but started swelling again) and Neck Swelling (Poss lymph swelling, pt states she was taking antibiotic for it but it didn't fully go away )            HPI  Patient presenting with ongoing right anterior neck swelling near the tonsillar and submandibular region.  Symptoms have been there for over 1 month.  Initially had some slight sinus congestion so her PCP provided a course of Augmentin which she finished to completion.  There was no significant interval improvement of her swelling.  There is no erythema, ecchymosis, tenderness.  She has no sore throat, headache, dizziness, shortness of breath or neck pain.    Patient also having prepatellar bursitis of her right knee after a fall exactly 1 month ago.  X-rays at that time were negative.  She states pain and range of motion have improved but the bursitis will fluctuate causing slight discomfort.  No erythema, joint pain, fever or other systemic symptoms.      PMH:  has a past medical history of Anesthesia, Hyperlipidemia, Hypertension, Incomplete right bundle branch block (RBBB), PAF (paroxysmal atrial fibrillation) ([facility]) 1/2014, PONV (postoperative nausea and vomiting), Sleep apnea, and Tobacco abuse.    She has no past medical history of Clotting disorder ([facility]), Heart attack ([facility]), Heart murmur, Seizure ([facility]), or Stroke ([facility]).  MEDS:   Current Outpatient Medications:     metoprolol SR (TOPROL XL) 25 MG TABLET SR 24 HR, Take 1/2 (one-half) tablet by mouth once daily, Disp: 45 Tablet, Rfl: 3    Cholecalciferol (VITAMIN D3) 2000 UNIT Cap, Take  by mouth., Disp: , Rfl:     Potassium (POTASSIMIN PO), Take  by mouth., Disp: , Rfl:     MAGNESIUM PO, Take  by mouth., Disp: , Rfl:     B Complex Vitamins (VITAMIN B COMPLEX PO), Take  by mouth., Disp: , Rfl:   ALLERGIES: No Known Allergies  SURGHX:   Past Surgical History:  "  Procedure Laterality Date    PRIMARY C SECTION      VEIN STRIPPING       SOCHX:  reports that she has been smoking cigarettes. She started smoking about 30 years ago. She has a 55.1 pack-year smoking history. She has never used smokeless tobacco. She reports that she does not drink alcohol and does not use drugs.  FH: family history includes Arrythmia in her brother; Cancer in her father and maternal grandmother; Cancer (age of onset: 41) in her sister; Dementia in her father; Diabetes in her father; Heart Disease in her mother; Hypertension in her mother.        Review of Systems   Constitutional: Negative.    HENT:  Negative for congestion, ear pain and sore throat.         Right anterior neck swelling   Respiratory: Negative.     Cardiovascular: Negative.    Gastrointestinal: Negative.    Genitourinary: Negative.    Musculoskeletal:  Positive for falls and joint pain.   Neurological: Negative.        Medications, Allergies, and current problem list reviewed today in Epic             Objective     /82   Pulse 74   Temp 36.6 °C (97.8 °F) (Temporal)   Resp 14   Ht 1.676 m (5' 6\")   Wt 108 kg (238 lb)   SpO2 98%   BMI 38.41 kg/m²      Physical Exam  Vitals and nursing note reviewed.   Constitutional:       General: She is not in acute distress.     Appearance: Normal appearance. She is well-developed. She is not ill-appearing, toxic-appearing or diaphoretic.   HENT:      Head: Normocephalic and atraumatic.      Right Ear: Hearing and external ear normal.      Left Ear: Hearing and external ear normal.      Nose: Nose normal.      Mouth/Throat:      Pharynx: No oropharyngeal exudate or posterior oropharyngeal erythema.   Eyes:      General:         Right eye: No discharge.         Left eye: No discharge.      Conjunctiva/sclera: Conjunctivae normal.   Neck:        Comments: Significant amount of soft tissue swelling on the right side anterior neck near the tonsillar and submandibular region.  Swelling " does not appear to cross the jawline.  There is no erythema, or ecchymosis.  Differential includes salivary gland versus lymphadenopathy versus other  Cardiovascular:      Rate and Rhythm: Normal rate and regular rhythm.      Heart sounds: Normal heart sounds.   Pulmonary:      Effort: Pulmonary effort is normal. No respiratory distress.      Breath sounds: Normal breath sounds. No wheezing, rhonchi or rales.   Musculoskeletal:      Cervical back: Full passive range of motion without pain, normal range of motion and neck supple.      Right knee: Swelling (Prepatellar bursitis) present. No deformity, effusion, erythema, bony tenderness or crepitus. No tenderness.   Skin:     General: Skin is warm and dry.   Neurological:      General: No focal deficit present.      Mental Status: She is alert and oriented to person, place, and time.   Psychiatric:         Mood and Affect: Mood normal.         Behavior: Behavior normal.         Thought Content: Thought content normal.         Judgment: Judgment normal.                             Assessment & Plan     This is a very pleasant 68-year-old female presenting with ongoing anterior right-sided cervical swelling near the submandibular and tonsillar region.  Initially with slight sinus congestion so she completed a full course of Augmentin from her PCP.  No interval improvement of her symptoms.  She continues to have significant soft tissue swelling.  There is no erythema ecchymosis tenderness.  No other URI symptoms.  No shortness of breath, stridor, fever, headache, dizziness or neck pain.  Vitals are normal.  Exam shows significant area of soft tissue swelling near the tonsillar and submandibular region.  Swelling does not appear to cross the jawline.  Remainder of ENT exam benign.  Full passive and active range of motion of neck.  Differential includes salivary gland versus parotid gland versus lymphadenopathy versus other.  Ultrasound ordered.  Will call and treat/refer  accordingly.  OTC meds and conservative measures until that time.    Ongoing prepatellar bursitis.  Full range of motion without tenderness or systemic symptoms.  Exam reveals no signs of infection.  Continue RICE therapy.  Referral to sports medicine for aspiration if does not resolve on its own.       1. Neck swelling  US-SOFT TISSUES OF HEAD - NECK      2. Prepatellar bursitis of right knee  Referral to Sports Medicine          I personally reviewed prior external notes and test results pertinent to today's visit. Return to clinic or go to ED if symptoms worsen or persist. Red flag symptoms and indications for ED discussed at length. Patient/Parent/Guardian voices understanding.  AVS with post-visit instructions provided or given verbally.  Follow-up with your primary care provider in 3-5 days. All side effects and potential interactions of prescribed medication discussed including allergic response, GI upset, tendon injury, rash, sedation, OCP effectiveness, etc.    Please note that this dictation was created using voice recognition software. I have made every reasonable attempt to correct obvious errors, but I expect that there are errors of grammar and possibly content that I did not discover before finalizing the note.

## 2024-03-05 ENCOUNTER — HOSPITAL ENCOUNTER (OUTPATIENT)
Dept: RADIOLOGY | Facility: MEDICAL CENTER | Age: 69
End: 2024-03-05
Attending: PHYSICIAN ASSISTANT
Payer: MEDICARE

## 2024-03-05 ENCOUNTER — OFFICE VISIT (OUTPATIENT)
Dept: SPORTS MEDICINE | Facility: OTHER | Age: 69
End: 2024-03-05
Attending: PHYSICIAN ASSISTANT
Payer: MEDICARE

## 2024-03-05 VITALS
HEIGHT: 66 IN | SYSTOLIC BLOOD PRESSURE: 124 MMHG | TEMPERATURE: 98.7 F | BODY MASS INDEX: 38.25 KG/M2 | HEART RATE: 80 BPM | DIASTOLIC BLOOD PRESSURE: 72 MMHG | OXYGEN SATURATION: 95 % | RESPIRATION RATE: 18 BRPM | WEIGHT: 238 LBS

## 2024-03-05 DIAGNOSIS — R22.1 NECK SWELLING: ICD-10-CM

## 2024-03-05 DIAGNOSIS — S80.01XD PATELLAR CONTUSION, RIGHT, SUBSEQUENT ENCOUNTER: ICD-10-CM

## 2024-03-05 DIAGNOSIS — F17.200 SMOKING: ICD-10-CM

## 2024-03-05 PROCEDURE — 3074F SYST BP LT 130 MM HG: CPT | Performed by: FAMILY MEDICINE

## 2024-03-05 PROCEDURE — 76536 US EXAM OF HEAD AND NECK: CPT

## 2024-03-05 PROCEDURE — 3078F DIAST BP <80 MM HG: CPT | Performed by: FAMILY MEDICINE

## 2024-03-05 PROCEDURE — 99214 OFFICE O/P EST MOD 30 MIN: CPT | Performed by: FAMILY MEDICINE

## 2024-03-05 PROCEDURE — 1170F FXNL STATUS ASSESSED: CPT | Performed by: FAMILY MEDICINE

## 2024-03-05 ASSESSMENT — FIBROSIS 4 INDEX: FIB4 SCORE: 1.2

## 2024-03-05 NOTE — PROGRESS NOTES
Chief Complaint   Patient presents with    Knee Pain     R knee pain      CHIEF COMPLAINT:  Rica Barrera female presenting at the request of Abrahan Franklin PA-C  for evaluation of knee pain.     Rica Barrera is complaining of right knee pain  Date of injury, January 20, 2024  Mechanism, tripped at home and landed on carpet onto a flexed knee  Pain is at the anterior knee, patellar  Quality is aching  Pain is non-radiating   Improved with resting, icing and compression  Aggravated by kneeling and crawling on the ground  no prior problems with this area in the past   Prior Treatments:  Seen at urgent care  Prior studies: X-Ray   Medications tried for pain include: ibuprofen (OTC), which helps, but she has not needed it lately  Mechanical Symptom history: No Locking    Retired  Homemaking, 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 No pertinent past medical history.    PMH:  has a past medical history of Anesthesia, Hyperlipidemia, Hypertension, Incomplete right bundle branch block (RBBB), PAF (paroxysmal atrial fibrillation) ([facility]) 1/2014, PONV (postoperative nausea and vomiting), Sleep apnea, and Tobacco abuse.    She has no past medical history of Clotting disorder ([facility]), Heart attack ([facility]), Heart murmur, Seizure ([facility]), or Stroke ([facility]).  MEDS:   Current Outpatient Medications:     metoprolol SR (TOPROL XL) 25 MG TABLET SR 24 HR, Take 1/2 (one-half) tablet by mouth once daily, Disp: 45 Tablet, Rfl: 3    Cholecalciferol (VITAMIN D3) 2000 UNIT Cap, Take  by mouth., Disp: , Rfl:     Potassium (POTASSIMIN PO), Take  by mouth., Disp: , Rfl:     MAGNESIUM PO, Take  by mouth., Disp: , Rfl:     B Complex Vitamins (VITAMIN B COMPLEX PO), Take  by mouth., Disp: , Rfl:   ALLERGIES: No Known Allergies  SURGHX:   Past Surgical History:   Procedure Laterality Date    PRIMARY C SECTION       "VEIN STRIPPING       SOCHX:  reports that she has been smoking cigarettes. She started smoking about 30 years ago. She has a 55.1 pack-year smoking history. She has never used smokeless tobacco. She reports that she does not drink alcohol and does not use drugs.  FH: Family history was reviewed, no pertinent findings to report     PHYSICAL EXAM:  /72 (BP Location: Left arm, Patient Position: Sitting, BP Cuff Size: Large adult)   Pulse 80   Temp 37.1 °C (98.7 °F) (Temporal)   Resp 18   Ht 1.676 m (5' 6\")   Wt 108 kg (238 lb)   SpO2 95%   BMI 38.41 kg/m²      well-developed, well-nourished in no apparent distress, alert and oriented x 3.  Gait: normal     RIGHT Knee:  Slight Varus and No Swelling  Range of Motion Intact  Trace effusion  Patellar  minimal prepatellar bursal swelling  Medial Joint Line Non-tender and NEGATIVE Mathew  Lateral Joint Line Non-tender and NEGATIVE Mathew  Trace Laxity with Varus stress  Trace Laxity with Valgus stress  Lachman's testing is Trace  Posterior Drawer Testing is Trace  The leg is otherwise neurovascularly intact    LEFT Knee:  Slight Varus and No Swelling   Range of Motion Intact  Trace effusion  Patellar No tenderness and no apprehension  Medial Joint Line Non-tender and NEGATIVE Mathew  Lateral Joint Line Non-tender and NEGATIVE Mathew  Trace Laxity with Varus stress  Trace Laxity with Valgus stress  Lachman's testing is Trace  Posterior Drawer Testing is Trace  The leg is otherwise neurovascularly intact    Additional Findings: None    1. Patellar contusion, right, subsequent encounter        2. Smoking  Referral to Tobacco Cessation Program        Date of injury, January 20, 2024  Mechanism, tripped at home and landed on carpet onto a flexed knee  Pain is at the anterior knee, patellar    Feeling better    She is smoking and interested in smoking cessation options  Ordered cessation consult    Follow-up as needed                   1/23/2024 12:06 PM   "   HISTORY/REASON FOR EXAM: Pain/Deformity Following Trauma. Pain. Pain with ambulation especially suprapatellar region     TECHNIQUE/EXAM DESCRIPTION AND NUMBER OF VIEWS: 4 views of the RIGHT knee.     COMPARISON: None     FINDINGS:  No joint effusion is seen.     No displaced fracture is visualized.     There is no subluxation.     Minimal patellofemoral marginal spurring and lateral joint space narrowing     Subcutaneous varicose veins are most pronounced laterally     IMPRESSION:     No radiographic evidence of acute displaced fracture     Mild patellofemoral osteoarthritis     Varicose veins           Exam Ended: 01/23/24 12:14 PM Last Resulted: 01/23/24  1:02 PM               Interpreted in the office today with the patient    Thank you Abrahan Franklin PA-C for allowing me to participate in care for your patient.

## 2024-03-06 ENCOUNTER — TELEPHONE (OUTPATIENT)
Dept: VASCULAR LAB | Facility: MEDICAL CENTER | Age: 69
End: 2024-03-06
Payer: MEDICARE

## 2024-03-06 NOTE — TELEPHONE ENCOUNTER
[facility]     Received smoking cessation referral from Dr. Santana on 3/5/24.     Called and spoke to patient. Initial visit scheduled on 3/13 at 2pm.     Insurance: [facility]  PCP: [facility]  Locations to be seen: Any     If no response by 4/5/24 (1 month from referral date) OR 2 no shows/cancellations, will remove from referral list and send FYI to referring provider    [facility] at [phone], fax [phone]    Franklin Cruz, PharmD, BCACP

## 2024-04-02 ENCOUNTER — HOSPITAL ENCOUNTER (OUTPATIENT)
Dept: LAB | Facility: MEDICAL CENTER | Age: 69
End: 2024-04-02
Attending: FAMILY MEDICINE
Payer: MEDICARE

## 2024-04-02 ENCOUNTER — APPOINTMENT (OUTPATIENT)
Dept: MEDICAL GROUP | Facility: PHYSICIAN GROUP | Age: 69
End: 2024-04-02
Payer: MEDICARE

## 2024-04-02 VITALS
SYSTOLIC BLOOD PRESSURE: 126 MMHG | WEIGHT: 239 LBS | BODY MASS INDEX: 38.41 KG/M2 | RESPIRATION RATE: 18 BRPM | HEART RATE: 60 BPM | TEMPERATURE: 97.9 F | HEIGHT: 66 IN | OXYGEN SATURATION: 98 % | DIASTOLIC BLOOD PRESSURE: 70 MMHG

## 2024-04-02 DIAGNOSIS — R59.0 LYMPHADENOPATHY OF RIGHT CERVICAL REGION: ICD-10-CM

## 2024-04-02 DIAGNOSIS — I45.10 INCOMPLETE RIGHT BUNDLE BRANCH BLOCK (RBBB): ICD-10-CM

## 2024-04-02 DIAGNOSIS — E66.01 MORBID (SEVERE) OBESITY DUE TO EXCESS CALORIES (HCC): ICD-10-CM

## 2024-04-02 DIAGNOSIS — I10 ESSENTIAL HYPERTENSION: ICD-10-CM

## 2024-04-02 PROBLEM — J01.00 SUBACUTE MAXILLARY SINUSITIS: Status: RESOLVED | Noted: 2024-01-30 | Resolved: 2024-04-02

## 2024-04-02 LAB
ALBUMIN SERPL BCP-MCNC: 4.3 G/DL (ref 3.2–4.9)
ALBUMIN/GLOB SERPL: 1.3 G/DL
ALP SERPL-CCNC: 92 U/L (ref 30–99)
ALT SERPL-CCNC: 13 U/L (ref 2–50)
ANION GAP SERPL CALC-SCNC: 11 MMOL/L (ref 7–16)
APPEARANCE UR: CLEAR
AST SERPL-CCNC: 20 U/L (ref 12–45)
BASOPHILS # BLD AUTO: 0.8 % (ref 0–1.8)
BASOPHILS # BLD: 0.05 K/UL (ref 0–0.12)
BILIRUB SERPL-MCNC: 0.4 MG/DL (ref 0.1–1.5)
BILIRUB UR QL STRIP.AUTO: NEGATIVE
BUN SERPL-MCNC: 11 MG/DL (ref 8–22)
CALCIUM ALBUM COR SERPL-MCNC: 9 MG/DL (ref 8.5–10.5)
CALCIUM SERPL-MCNC: 9.2 MG/DL (ref 8.5–10.5)
CHLORIDE SERPL-SCNC: 101 MMOL/L (ref 96–112)
CHOLEST SERPL-MCNC: 192 MG/DL (ref 100–199)
CO2 SERPL-SCNC: 27 MMOL/L (ref 20–33)
COLOR UR: YELLOW
CREAT SERPL-MCNC: 0.88 MG/DL (ref 0.5–1.4)
EOSINOPHIL # BLD AUTO: 0.14 K/UL (ref 0–0.51)
EOSINOPHIL NFR BLD: 2.3 % (ref 0–6.9)
ERYTHROCYTE [DISTWIDTH] IN BLOOD BY AUTOMATED COUNT: 46.5 FL (ref 35.9–50)
FASTING STATUS PATIENT QL REPORTED: NORMAL
GFR SERPLBLD CREATININE-BSD FMLA CKD-EPI: 70 ML/MIN/1.73 M 2
GLOBULIN SER CALC-MCNC: 3.3 G/DL (ref 1.9–3.5)
GLUCOSE SERPL-MCNC: 100 MG/DL (ref 65–99)
GLUCOSE UR STRIP.AUTO-MCNC: NEGATIVE MG/DL
HCT VFR BLD AUTO: 48.2 % (ref 37–47)
HDLC SERPL-MCNC: 47 MG/DL
HGB BLD-MCNC: 15.7 G/DL (ref 12–16)
IMM GRANULOCYTES # BLD AUTO: 0.01 K/UL (ref 0–0.11)
IMM GRANULOCYTES NFR BLD AUTO: 0.2 % (ref 0–0.9)
KETONES UR STRIP.AUTO-MCNC: NEGATIVE MG/DL
LDLC SERPL CALC-MCNC: 122 MG/DL
LEUKOCYTE ESTERASE UR QL STRIP.AUTO: NEGATIVE
LYMPHOCYTES # BLD AUTO: 1.9 K/UL (ref 1–4.8)
LYMPHOCYTES NFR BLD: 30.7 % (ref 22–41)
MCH RBC QN AUTO: 30.8 PG (ref 27–33)
MCHC RBC AUTO-ENTMCNC: 32.6 G/DL (ref 32.2–35.5)
MCV RBC AUTO: 94.7 FL (ref 81.4–97.8)
MICRO URNS: NORMAL
MONOCYTES # BLD AUTO: 0.55 K/UL (ref 0–0.85)
MONOCYTES NFR BLD AUTO: 8.9 % (ref 0–13.4)
NEUTROPHILS # BLD AUTO: 3.53 K/UL (ref 1.82–7.42)
NEUTROPHILS NFR BLD: 57.1 % (ref 44–72)
NITRITE UR QL STRIP.AUTO: NEGATIVE
NRBC # BLD AUTO: 0 K/UL
NRBC BLD-RTO: 0 /100 WBC (ref 0–0.2)
PH UR STRIP.AUTO: 6.5 [PH] (ref 5–8)
PLATELET # BLD AUTO: 247 K/UL (ref 164–446)
PMV BLD AUTO: 9.6 FL (ref 9–12.9)
POTASSIUM SERPL-SCNC: 4.2 MMOL/L (ref 3.6–5.5)
PROT SERPL-MCNC: 7.6 G/DL (ref 6–8.2)
PROT UR QL STRIP: NEGATIVE MG/DL
RBC # BLD AUTO: 5.09 M/UL (ref 4.2–5.4)
RBC UR QL AUTO: NEGATIVE
SODIUM SERPL-SCNC: 139 MMOL/L (ref 135–145)
SP GR UR STRIP.AUTO: 1
TRIGL SERPL-MCNC: 117 MG/DL (ref 0–149)
UROBILINOGEN UR STRIP.AUTO-MCNC: 0.2 MG/DL
WBC # BLD AUTO: 6.2 K/UL (ref 4.8–10.8)

## 2024-04-02 PROCEDURE — 85025 COMPLETE CBC W/AUTO DIFF WBC: CPT

## 2024-04-02 PROCEDURE — 81003 URINALYSIS AUTO W/O SCOPE: CPT

## 2024-04-02 PROCEDURE — 1170F FXNL STATUS ASSESSED: CPT | Performed by: FAMILY MEDICINE

## 2024-04-02 PROCEDURE — 80061 LIPID PANEL: CPT

## 2024-04-02 PROCEDURE — 3078F DIAST BP <80 MM HG: CPT | Performed by: FAMILY MEDICINE

## 2024-04-02 PROCEDURE — 80053 COMPREHEN METABOLIC PANEL: CPT

## 2024-04-02 PROCEDURE — 36415 COLL VENOUS BLD VENIPUNCTURE: CPT

## 2024-04-02 PROCEDURE — 3074F SYST BP LT 130 MM HG: CPT | Performed by: FAMILY MEDICINE

## 2024-04-02 PROCEDURE — 99213 OFFICE O/P EST LOW 20 MIN: CPT | Performed by: FAMILY MEDICINE

## 2024-04-02 ASSESSMENT — FIBROSIS 4 INDEX: FIB4 SCORE: 1.2

## 2024-04-02 NOTE — PROGRESS NOTES
Subjective:     CC: Here for follow-up on enlarged right lymph node.  Accompanied by her .    HPI:   Rica presents today with the following medical concern:    Lymphadenopathy of right cervical region  This is now chronic problem.  Patient was seen in January with an enlarged right cervical node.  We treated her for possible sinus infection to see if it would resolve but it did not.  She did not return to clinic but did go to urgent care in early March and was evaluated there.  An ultrasound showed a 2.3 x 2.1 cm enlarged lymph node anterior to the right parotid gland system.  She states that the area has not changed although sometimes it seems larger and smaller.  It is nontender.  No trouble swallowing.  No throat pain.    Incomplete right bundle branch block (RBBB)  This is a chronic issue.  Noted on EKG.    Essential hypertension  This is a chronic stable condition.  Well-controlled on current medications.    Morbid (severe) obesity due to excess calories (HCC)  This is a chronic problem.  Continue to encourage weight reduction.    Past Medical History:   Diagnosis Date    Anesthesia     Hyperlipidemia     Hypertension     Incomplete right bundle branch block (RBBB)     PAF (paroxysmal atrial fibrillation) (HCC) 1/2014     PONV (postoperative nausea and vomiting)     Sleep apnea     Tobacco abuse        Social History     Tobacco Use    Smoking status: Every Day     Current packs/day: 1.00     Average packs/day: 0.7 packs/day for 80.2 years (55.2 ttl pk-yrs)     Types: Cigarettes     Start date: 1/30/1994    Smokeless tobacco: Never    Tobacco comments:     4-5 cigarettes a day   Vaping Use    Vaping Use: Never used   Substance Use Topics    Alcohol use: No     Alcohol/week: 0.0 oz    Drug use: No       Current Outpatient Medications Ordered in Epic   Medication Sig Dispense Refill    metoprolol SR (TOPROL XL) 25 MG TABLET SR 24 HR Take 1/2 (one-half) tablet by mouth once daily 45 Tablet 3     "Cholecalciferol (VITAMIN D3) 2000 UNIT Cap Take  by mouth.      Potassium (POTASSIMIN PO) Take  by mouth.      MAGNESIUM PO Take  by mouth.      B Complex Vitamins (VITAMIN B COMPLEX PO) Take  by mouth.       No current Epic-ordered facility-administered medications on file.       Allergies:  Patient has no known allergies.    Health Maintenance: Completed    ROS:  Gen: no fevers/chills, no changes in weight  Eyes: no changes in vision  ENT: no sore throat, no hearing loss, no bloody nose  Pulm: no sob, no cough  CV: no chest pain, no palpitations  GI: no nausea/vomiting, no diarrhea  : no dysuria  MSk: no myalgias  Skin: no rash  Neuro: no headaches, no numbness/tingling  Heme/Lymph: no easy bruising      Objective:       Exam:  /70 (BP Location: Left arm, Patient Position: Sitting, BP Cuff Size: Large adult)   Pulse 60   Temp 36.6 °C (97.9 °F) (Temporal)   Resp 18   Ht 1.676 m (5' 6\")   Wt 108 kg (239 lb)   SpO2 98%   BMI 38.58 kg/m²  Body mass index is 38.58 kg/m².    Gen: Alert and oriented, No apparent distress.  ENT:    Ear canals and TMs are clear.  Throat is clear.  No lesions are seen.  Neck: Neck is supple .  Enlarged nontender right anterior cervical node just in the jawline.  Lungs: Normal effort,   Ext: No clubbing, cyanosis, edema.      Labs: Ordered    Assessment & Plan:     68 y.o. female with the following -     1. Essential hypertension  This is a chronic stable condition.  Continue current medications.  Baseline labs ordered.  - Comp Metabolic Panel; Future  - Lipid Profile; Future  - URINALYSIS,CULTURE IF INDICATED; Future  - CBC WITH DIFFERENTIAL; Future  - ESTIMATED GFR; Future    2. Lymphadenopathy of right cervical region  This is now chronic problem.  Urgent referral made to ENT to see for a complete evaluation and biopsy at least of the lymph node.  She was told if she cannot get an appointment with them for several months to let me know and we will try to see if we can get a " biopsy done through interventional radiology.  - Referral to ENT    3. Morbid (severe) obesity due to excess calories (HCC)  This is a chronic problem.  Encourage weight reduction.    4. Body mass index (BMI) 38.0-38.9, adult  This is a chronic problem.  Encourage weight reduction.    5. Incomplete right bundle branch block (RBBB)  This is a chronic stable condition.      Return if symptoms worsen or fail to improve.    Please note that this dictation was created using voice recognition software. I have made every reasonable attempt to correct obvious errors, but I expect that there are errors of grammar and possibly content that I did not discover before finalizing the note.

## 2024-04-02 NOTE — ASSESSMENT & PLAN NOTE
This is now chronic problem.  Patient was seen in January with an enlarged right cervical node.  We treated her for possible sinus infection to see if it would resolve but it did not.  She did not return to clinic but did go to urgent care in early March and was evaluated there.  An ultrasound showed a 2.3 x 2.1 cm enlarged lymph node anterior to the right parotid gland system.  She states that the area has not changed although sometimes it seems larger and smaller.  It is nontender.  No trouble swallowing.  No throat pain.

## 2024-04-04 NOTE — PROGRESS NOTES
Smoking cessation visit  Rica Barrera  1955 04/04/24    Start time 1008am     HPI:    The patient presents for smoking cessation counseling.   The patient reports a history of smoking 7-10 cigarettes per day for 40 years.   The patient has tried to quit smoking in the past without success.    We will work with the patient to develop a smoking cessation plan tailored to their individual needs. This plan will include counseling and education on the health risks associated with smoking, as well as strategies for coping with nicotine withdrawal symptoms. We will also discuss pharmacotherapy options such as nicotine replacement therapy and prescription medications.    There are several drugs that are approved by the U.[place]. Food and Drug Administration (FDA) to help people quit smoking. These drugs work by reducing nicotine cravings and withdrawal symptoms, making it easier for people to quit smoking. Some of the best drugs for smoking cessation include:    Nicotine replacement therapy (NRT): This includes products like nicotine gum, patches, lozenges, and inhalers, which provide nicotine to the body in a controlled way, without the harmful chemicals found in tobacco smoke.  2.   Bupropion: This medication is an antidepressant that has been found to help reduce cravings and withdrawal symptoms associated with quitting smoking. It is sold under the brand names Zyban and Wellbutrin.  3.    Varenicline: This medication works by blocking the effects of nicotine on the brain, reducing the pleasure that smokers get from smoking. It is sold under the brand name Chantix.    It is important to note that these medications should be used under the guidance of a healthcare professional, as they can have side effects and may interact with other medications. Additionally, medication is just one part of a comprehensive smoking cessation program, which should also include counseling and support.    The patient has previously  "tried and failed the following pharmacological interventions  She has successfully quit in the past using nicotine patches.  The longest she was able to quit was approximately 2 years    Patient was referred by:  Marc Fields III, M.D.  [address] [facility] [id number] [phone]    Patient Active Problem List   Diagnosis    Dyslipidemia    Panic attack    PAF (paroxysmal atrial fibrillation) (HCC) 1/2014    Abnormal finding on thallium stress test - fasle positive subsequent stress echo normal    Essential hypertension    BMI 38.0-38.9,adult    Incomplete right bundle branch block (RBBB)    Tobacco use    Hand weakness    Muscle cramp    Bilateral carpal tunnel syndrome    Lumbar radiculopathy    Morbid (severe) obesity due to excess calories (HCC)    Right carpal tunnel syndrome    Cubital tunnel syndrome, right    Lymphadenopathy of right cervical region       Labs / Images Reviewed:     Vitals:    04/05/24 1047   Pulse: 84   SpO2: 98%   Weight: 108 kg (239 lb)   Height: 1.676 m (5' 6\")       Lab Results   Component Value Date/Time    CHOLSTRLTOT 192 04/02/2024 10:47 AM     (H) 04/02/2024 10:47 AM    HDL 47 04/02/2024 10:47 AM    TRIGLYCERIDE 117 04/02/2024 10:47 AM       Lab Results   Component Value Date/Time    SODIUM 139 04/02/2024 10:47 AM    POTASSIUM 4.2 04/02/2024 10:47 AM    CHLORIDE 101 04/02/2024 10:47 AM    CO2 27 04/02/2024 10:47 AM    GLUCOSE 100 (H) 04/02/2024 10:47 AM    BUN 11 04/02/2024 10:47 AM    CREATININE 0.88 04/02/2024 10:47 AM     Lab Results   Component Value Date/Time    ALKPHOSPHAT 92 04/02/2024 10:47 AM    ASTSGOT 20 04/02/2024 10:47 AM    ALTSGPT 13 04/02/2024 10:47 AM    TBILIRUBIN 0.4 04/02/2024 10:47 AM    ALBUMIN 4.3 04/02/2024 10:47 AM      Immunization History   Administered Date(s) Administered    Influenza Vaccine Adult HD 11/07/2018, 12/23/2022    Influenza Vaccine Quad Inj (Pf) 12/29/2017    Pneumococcal Conjugate Vaccine (Prevnar/PCV-13) 03/11/2020 "    Pneumococcal polysaccharide vaccine (PPSV-23) 11/07/2018    Tdap Vaccine 01/11/2008, 03/11/2020       Current Outpatient Medications:     Varenicline Tartrate (Starter), Use starter pack as instructed    nicotine, 1 Patch, Transdermal, Q24HRS    nicotine polacrilex, 4 mg, Oral, Q HOUR PRN    metoprolol SR, Take 1/2 (one-half) tablet by mouth once daily    Vitamin D3, Take  by mouth.    Potassium (POTASSIMIN PO), Take  by mouth.    MAGNESIUM PO, Take  by mouth.    B Complex Vitamins (VITAMIN B COMPLEX PO), Take  by mouth.    Drug interactions    NONE    Assessment and plan    Education:   The patient was provided with information regarding the benefits of quitting smoking, the negative health consequences of smoking, and strategies for quitting smoking. The patient was also advised to avoid triggers that may increase the urge to smoke.  Pharmacotherapy plan:  We will send in a prescription for Chantix, with the instructions noted below.  If this medication is too expensive she will use nicotine patches and gum.  Based on the amount of cigarettes she uses in the time the first cigarette, she will use a 21 mg patch on the 4 mg gum as instructed below                      Smoking cessation (patients should completely stop smoking upon initiation of therapy): Note: Extended duration therapy (>12 weeks) of nicotine cessation products is often used to ensure patient does not return to smoking (ACC [Ruddy 2018]; ATS [Santos 2020]).    Gum: Oral: If strong or frequent cravings are present after 1 piece of gum, may use a second piece within the hour (do not continuously use one piece after the other). Patients who smoke their first cigarette within 30 minutes of waking should use the 4 mg strength; otherwise the 2 mg strength is recommended. Use according to the following 12-week dosing schedule:  Weeks 1 to 6: Chew 1 piece of gum every 1 to 2 hours (maximum: 24 pieces/day); to increase chances of quitting, chew at least 9  pieces/day during the first 6 weeks.  Weeks 7 to 9: Chew 1 piece of gum every 2 to 4 hours (maximum: 24 pieces/day).  Weeks 10 to 12: Chew 1 piece of gum every 4 to 8 hours (maximum: 24 pieces/day).    Inhalation: Oral:  Initial treatment: 6 to 16 cartridges/day (at least 6 cartridges/day for the first 3 to 6 weeks) for up to 12 weeks; maximum: 16 cartridges/day. Note: Best effect achieved with frequent continuous puffing (20 minutes). Use beyond 6 months is not recommended (has not been studied). If patient is unable to stop smoking by the fourth week of therapy, consider discontinuation.  Discontinuation of therapy: After initial treatment, gradually reduce daily dose over 6 to 12 weeks. Some patients may not require gradual reduction of dosage and may stop treatment abruptly.    Lozenge: Oral: Do not use more than 1 lozenge at a time. Patients who smoke their first cigarette within 30 minutes of waking should use the 4 mg strength; otherwise the 2 mg strength is recommended. Use according to the following 12-week dosing schedule:  Weeks 1 to 6: 1 lozenge every 1 to 2 hours (maximum: 5 lozenges every 6 hours; 20 lozenges/day); to increase chances of quitting, use at least 9 lozenges/day during the first 6 weeks.  Weeks 7 to 9: 1 lozenge every 2 to 4 hours (maximum: 5 lozenges every 6 hours; 20 lozenges/day).  Weeks 10 to 12: 1 lozenge every 4 to 8 hours (maximum: 5 lozenges every 6 hours; 20 lozenges/day).    Nasal: Spray: Initial: 1 to 2 doses/hour (each dose [2 sprays, one in each nostril] contains 1 mg of nicotine); adjust dose as needed based on patient response; do not exceed more than 5 doses (10 sprays) per hour [maximum: 40 mg/day (80 sprays)] or 3 months of treatment. Note: For best results, use at least the recommended minimum of 8 doses per day (less is unlikely to be effective). Use beyond 6 months is not recommended (has not been studied). If patient is unable to stop smoking by the fourth week of  thereapy, consider discontinuation.  Discontinuation of therapy: Discontinue over 4 to 6 weeks. Some patients may not require gradual reduction of dosage and may stop treatment abruptly.    Transdermal patch: Topical: Note: Adjustment may be required during initial treatment (move to higher dose if experiencing withdrawal symptoms; lower dose if side effects are experienced).  Patients smoking >10 cigarettes/day: Begin with step 1 (21 mg/day) for 6 weeks, followed by step 2 (14 mg/day) for 2 weeks; finish with step 3 (7 mg/day) for 2 weeks.  Patients smoking ?10 cigarettes/day: Begin with step 2 (14 mg/day) for 6 weeks, followed by step 3 (7 mg/day) for 2 weeks.      http://www.rlandrew[facility].org/pdf_files/handbk_of_enteralfeeding.pdf       Follow-up:    The patient has expressed a willingness to make a quit attempt and will be scheduled for follow-up visits to monitor progress and provide additional support.      Time at end 1033    Nile Steve, PharmD, MS, BCACP, LCC    [facility]  Phone [phone] fax [phone]    This note was created using voice recognition software (Dragon). The accuracy of the dictation is limited by the abilities of the software. I have reviewed the note prior to signing, however some errors in grammar and context are still possible. If you have any questions related to this note please do not hesitate to contact our office.

## 2024-04-05 ENCOUNTER — APPOINTMENT (OUTPATIENT)
Dept: MEDICAL GROUP | Facility: PHYSICIAN GROUP | Age: 69
End: 2024-04-05
Payer: MEDICARE

## 2024-04-05 VITALS — WEIGHT: 239 LBS | HEART RATE: 84 BPM | OXYGEN SATURATION: 98 % | BODY MASS INDEX: 38.41 KG/M2 | HEIGHT: 66 IN

## 2024-04-05 DIAGNOSIS — Z72.0 TOBACCO USE: ICD-10-CM

## 2024-04-05 PROCEDURE — 99407 BEHAV CHNG SMOKING > 10 MIN: CPT | Performed by: FAMILY MEDICINE

## 2024-04-05 RX ORDER — NICOTINE 21 MG/24HR
1 PATCH, TRANSDERMAL 24 HOURS TRANSDERMAL EVERY 24 HOURS
Qty: 30 PATCH | Refills: 0 | Status: SHIPPED | OUTPATIENT
Start: 2024-04-05

## 2024-04-05 RX ORDER — VARENICLINE TARTRATE 0.5 (11)-1
KIT ORAL
Qty: 1 EACH | Refills: 0 | Status: SHIPPED | OUTPATIENT
Start: 2024-04-05

## 2024-04-05 ASSESSMENT — FIBROSIS 4 INDEX: FIB4 SCORE: 1.57

## 2024-04-08 DIAGNOSIS — R59.0 LYMPHADENOPATHY OF RIGHT CERVICAL REGION: ICD-10-CM

## 2024-05-07 ENCOUNTER — HOSPITAL ENCOUNTER (OUTPATIENT)
Dept: RADIOLOGY | Facility: MEDICAL CENTER | Age: 69
End: 2024-05-07
Attending: FAMILY MEDICINE
Payer: MEDICARE

## 2024-05-07 DIAGNOSIS — R59.0 LYMPHADENOPATHY, CERVICAL: ICD-10-CM

## 2024-05-07 NOTE — PROGRESS NOTES
US guided right cervical lymph node fine needle aspiration done by Dr. Miranda; NON-SEDATION (no H&P required as this is a NON SEDATION procedure) right anterior aspect of neck access site, dressing CDI; 2 samples in 1 jar of cytolyt obtained, 1 sample in 1 vial RPMI obtained and sent to lab. Pt tolerated the procedure well. Pt hemodynamically stable pre/intra/post procedure; all questions and concerns answered prior to being d/c; patient provided with appropriate education for procedure; pt d/c home.

## 2024-05-08 LAB — CYTOLOGY REG CYTOL: NORMAL

## 2024-05-28 DIAGNOSIS — L98.9 LESION OF NECK: ICD-10-CM

## 2025-01-16 DIAGNOSIS — I48.0 PAF (PAROXYSMAL ATRIAL FIBRILLATION) (HCC): ICD-10-CM

## 2025-01-16 DIAGNOSIS — I10 ESSENTIAL HYPERTENSION: ICD-10-CM

## 2025-01-17 RX ORDER — METOPROLOL SUCCINATE 25 MG/1
TABLET, EXTENDED RELEASE ORAL
Qty: 45 TABLET | Refills: 0 | Status: SHIPPED | OUTPATIENT
Start: 2025-01-17

## 2025-01-17 NOTE — TELEPHONE ENCOUNTER
Received request via: Pharmacy    Was the patient seen in the last year in this department? Yes    Does the patient have an active prescription (recently filled or refills available) for medication(s) requested? No    Pharmacy Name: [facility] [address] [place] [address]     Does the patient have USP Plus and need 100-day supply? (This applies to ALL medications) Patient does not have SCP

## 2025-03-05 ENCOUNTER — OFFICE VISIT (OUTPATIENT)
Dept: MEDICAL GROUP | Facility: PHYSICIAN GROUP | Age: 70
End: 2025-03-05
Payer: MEDICARE

## 2025-03-05 ENCOUNTER — HOSPITAL ENCOUNTER (OUTPATIENT)
Dept: LAB | Facility: MEDICAL CENTER | Age: 70
End: 2025-03-05
Attending: FAMILY MEDICINE
Payer: MEDICARE

## 2025-03-05 VITALS
BODY MASS INDEX: 38.41 KG/M2 | DIASTOLIC BLOOD PRESSURE: 68 MMHG | HEIGHT: 66 IN | WEIGHT: 239 LBS | HEART RATE: 58 BPM | TEMPERATURE: 97.8 F | RESPIRATION RATE: 16 BRPM | OXYGEN SATURATION: 96 % | SYSTOLIC BLOOD PRESSURE: 122 MMHG

## 2025-03-05 DIAGNOSIS — E78.5 DYSLIPIDEMIA: ICD-10-CM

## 2025-03-05 DIAGNOSIS — I10 ESSENTIAL HYPERTENSION: ICD-10-CM

## 2025-03-05 DIAGNOSIS — R59.0 LYMPHADENOPATHY OF RIGHT CERVICAL REGION: ICD-10-CM

## 2025-03-05 DIAGNOSIS — I48.0 PAF (PAROXYSMAL ATRIAL FIBRILLATION) (HCC): Chronic | ICD-10-CM

## 2025-03-05 DIAGNOSIS — Z72.0 TOBACCO USE: ICD-10-CM

## 2025-03-05 DIAGNOSIS — Z23 NEED FOR VACCINATION: ICD-10-CM

## 2025-03-05 PROBLEM — G56.01 RIGHT CARPAL TUNNEL SYNDROME: Status: RESOLVED | Noted: 2023-09-06 | Resolved: 2025-03-05

## 2025-03-05 PROBLEM — R25.2 MUSCLE CRAMP: Status: RESOLVED | Noted: 2022-02-28 | Resolved: 2025-03-05

## 2025-03-05 LAB
ALBUMIN SERPL BCP-MCNC: 4 G/DL (ref 3.2–4.9)
ALBUMIN/GLOB SERPL: 1.3 G/DL
ALP SERPL-CCNC: 86 U/L (ref 30–99)
ALT SERPL-CCNC: 15 U/L (ref 2–50)
ANION GAP SERPL CALC-SCNC: 10 MMOL/L (ref 7–16)
APPEARANCE UR: CLEAR
AST SERPL-CCNC: 16 U/L (ref 12–45)
BACTERIA #/AREA URNS HPF: ABNORMAL /HPF
BASOPHILS # BLD AUTO: 0.8 % (ref 0–1.8)
BASOPHILS # BLD: 0.05 K/UL (ref 0–0.12)
BILIRUB SERPL-MCNC: 0.4 MG/DL (ref 0.1–1.5)
BILIRUB UR QL STRIP.AUTO: NEGATIVE
BUN SERPL-MCNC: 12 MG/DL (ref 8–22)
CALCIUM ALBUM COR SERPL-MCNC: 9.4 MG/DL (ref 8.5–10.5)
CALCIUM SERPL-MCNC: 9.4 MG/DL (ref 8.5–10.5)
CASTS URNS QL MICRO: ABNORMAL /LPF (ref 0–2)
CHLORIDE SERPL-SCNC: 101 MMOL/L (ref 96–112)
CHOLEST SERPL-MCNC: 179 MG/DL (ref 100–199)
CO2 SERPL-SCNC: 27 MMOL/L (ref 20–33)
COLOR UR: YELLOW
CREAT SERPL-MCNC: 0.9 MG/DL (ref 0.5–1.4)
EOSINOPHIL # BLD AUTO: 0.18 K/UL (ref 0–0.51)
EOSINOPHIL NFR BLD: 3 % (ref 0–6.9)
EPITHELIAL CELLS 1715: ABNORMAL /HPF (ref 0–5)
ERYTHROCYTE [DISTWIDTH] IN BLOOD BY AUTOMATED COUNT: 45.3 FL (ref 35.9–50)
GFR SERPLBLD CREATININE-BSD FMLA CKD-EPI: 68 ML/MIN/1.73 M 2
GLOBULIN SER CALC-MCNC: 3.2 G/DL (ref 1.9–3.5)
GLUCOSE SERPL-MCNC: 91 MG/DL (ref 65–99)
GLUCOSE UR STRIP.AUTO-MCNC: NEGATIVE MG/DL
HCT VFR BLD AUTO: 46.5 % (ref 37–47)
HDLC SERPL-MCNC: 48 MG/DL
HGB BLD-MCNC: 15.3 G/DL (ref 12–16)
IMM GRANULOCYTES # BLD AUTO: 0.02 K/UL (ref 0–0.11)
IMM GRANULOCYTES NFR BLD AUTO: 0.3 % (ref 0–0.9)
KETONES UR STRIP.AUTO-MCNC: NEGATIVE MG/DL
LDLC SERPL CALC-MCNC: 109 MG/DL
LEUKOCYTE ESTERASE UR QL STRIP.AUTO: ABNORMAL
LYMPHOCYTES # BLD AUTO: 1.71 K/UL (ref 1–4.8)
LYMPHOCYTES NFR BLD: 28.1 % (ref 22–41)
MCH RBC QN AUTO: 30.6 PG (ref 27–33)
MCHC RBC AUTO-ENTMCNC: 32.9 G/DL (ref 32.2–35.5)
MCV RBC AUTO: 93 FL (ref 81.4–97.8)
MICRO URNS: ABNORMAL
MONOCYTES # BLD AUTO: 0.42 K/UL (ref 0–0.85)
MONOCYTES NFR BLD AUTO: 6.9 % (ref 0–13.4)
NEUTROPHILS # BLD AUTO: 3.7 K/UL (ref 1.82–7.42)
NEUTROPHILS NFR BLD: 60.9 % (ref 44–72)
NITRITE UR QL STRIP.AUTO: NEGATIVE
NRBC # BLD AUTO: 0 K/UL
NRBC BLD-RTO: 0 /100 WBC (ref 0–0.2)
PH UR STRIP.AUTO: 6.5 [PH] (ref 5–8)
PLATELET # BLD AUTO: 256 K/UL (ref 164–446)
PMV BLD AUTO: 9.5 FL (ref 9–12.9)
POTASSIUM SERPL-SCNC: 4.9 MMOL/L (ref 3.6–5.5)
PROT SERPL-MCNC: 7.2 G/DL (ref 6–8.2)
PROT UR QL STRIP: NEGATIVE MG/DL
RBC # BLD AUTO: 5 M/UL (ref 4.2–5.4)
RBC # URNS HPF: ABNORMAL /HPF (ref 0–2)
RBC UR QL AUTO: NEGATIVE
SODIUM SERPL-SCNC: 138 MMOL/L (ref 135–145)
SP GR UR STRIP.AUTO: 1.01
TRIGL SERPL-MCNC: 110 MG/DL (ref 0–149)
UROBILINOGEN UR STRIP.AUTO-MCNC: 0.2 EU/DL
WBC # BLD AUTO: 6.1 K/UL (ref 4.8–10.8)
WBC #/AREA URNS HPF: ABNORMAL /HPF

## 2025-03-05 PROCEDURE — 80053 COMPREHEN METABOLIC PANEL: CPT

## 2025-03-05 PROCEDURE — 36415 COLL VENOUS BLD VENIPUNCTURE: CPT

## 2025-03-05 PROCEDURE — 1170F FXNL STATUS ASSESSED: CPT | Performed by: FAMILY MEDICINE

## 2025-03-05 PROCEDURE — 3074F SYST BP LT 130 MM HG: CPT | Performed by: FAMILY MEDICINE

## 2025-03-05 PROCEDURE — 81001 URINALYSIS AUTO W/SCOPE: CPT

## 2025-03-05 PROCEDURE — 99214 OFFICE O/P EST MOD 30 MIN: CPT | Mod: 25 | Performed by: FAMILY MEDICINE

## 2025-03-05 PROCEDURE — 85025 COMPLETE CBC W/AUTO DIFF WBC: CPT

## 2025-03-05 PROCEDURE — 3078F DIAST BP <80 MM HG: CPT | Performed by: FAMILY MEDICINE

## 2025-03-05 PROCEDURE — 80061 LIPID PANEL: CPT

## 2025-03-05 PROCEDURE — G0008 ADMIN INFLUENZA VIRUS VAC: HCPCS | Performed by: FAMILY MEDICINE

## 2025-03-05 PROCEDURE — 90662 IIV NO PRSV INCREASED AG IM: CPT | Performed by: FAMILY MEDICINE

## 2025-03-05 ASSESSMENT — PATIENT HEALTH QUESTIONNAIRE - PHQ9: CLINICAL INTERPRETATION OF PHQ2 SCORE: 0

## 2025-03-05 ASSESSMENT — FIBROSIS 4 INDEX: FIB4 SCORE: 1.59

## 2025-03-05 NOTE — ASSESSMENT & PLAN NOTE
This is a chronic problem.  Patient states that last week she had several episodes of what she thinks is atrial fibrillation.  She has not seen the cardiologist since 2020.  At that point he told her she could not stop taking an aspirin today.  I told her she should restart it and get a new assessment.  We will refer her to cardiology.  Denies any current symptoms.  She takes her metoprolol intermittently and is on a very low dose.

## 2025-03-05 NOTE — PROGRESS NOTES
Subjective:     CC: Here for her exam and other issues.    HPI:   Rica presents today with the following medical concerns:    PAF (paroxysmal atrial fibrillation) (HCC) 1/2014  This is a chronic problem.  Patient states that last week she had several episodes of what she thinks is atrial fibrillation.  She has not seen the cardiologist since 2020.  At that point he told her she could not stop taking an aspirin today.  I told her she should restart it and get a new assessment.  We will refer her to cardiology.  Denies any current symptoms.  She takes her metoprolol intermittently and is on a very low dose.    Tobacco use  This is a chronic problem.  We talked about lung cancer screening and she declines.    Lymphadenopathy of right cervical region  This is a chronic problem.  She did see ENT last fall for the swelling under the right angle of her jaw.  She states an ultrasound was done and it showed that it was a cyst.  We do not have the final records.    Essential hypertension  This is a chronic issue.  Even though she takes her metoprolol intermittently her blood pressure remains under good control.    Dyslipidemia  This is a chronic problem.  She has mild elevation of her LDL but declines any medication if she treats it with diet.    Past Medical History:   Diagnosis Date    Anesthesia     Hyperlipidemia     Hypertension     Incomplete right bundle branch block (RBBB)     PAF (paroxysmal atrial fibrillation) (HCC) 1/2014     PONV (postoperative nausea and vomiting)     Sleep apnea     Tobacco abuse        Social History     Tobacco Use    Smoking status: Every Day     Current packs/day: 1.00     Average packs/day: 0.7 packs/day for 81.1 years (56.1 ttl pk-yrs)     Types: Cigarettes     Start date: 1/30/1994    Smokeless tobacco: Never    Tobacco comments:     4-5 cigarettes a day   Vaping Use    Vaping status: Never Used   Substance Use Topics    Alcohol use: No     Alcohol/week: 0.0 oz    Drug use: No  "      Current Outpatient Medications Ordered in Epic   Medication Sig Dispense Refill    metoprolol SR (TOPROL XL) 25 MG TABLET SR 24 HR Take 1/2 (one-half) tablet by mouth once daily 45 Tablet 0    Cholecalciferol (VITAMIN D3) 2000 UNIT Cap Take  by mouth.      Potassium (POTASSIMIN PO) Take  by mouth.      MAGNESIUM PO Take  by mouth.      B Complex Vitamins (VITAMIN B COMPLEX PO) Take  by mouth.       No current Epic-ordered facility-administered medications on file.       Allergies:  Patient has no known allergies.    Health Maintenance: Completed    ROS:  Gen: no fevers/chills, no changes in weight  Eyes: no changes in vision  ENT: no sore throat, no hearing loss, no bloody nose  Pulm: no sob, no cough  CV: no chest pain, no palpitations  GI: no nausea/vomiting, no diarrhea  : no dysuria  MSk: no myalgias  Skin: no rash  Neuro: no headaches, no numbness/tingling  Heme/Lymph: no easy bruising      Objective:       Exam:  /68 (BP Location: Left arm, Patient Position: Sitting, BP Cuff Size: Adult)   Pulse (!) 58   Temp 36.6 °C (97.8 °F) (Temporal)   Resp 16   Ht 1.676 m (5' 6\")   Wt 108 kg (239 lb)   SpO2 96%   BMI 38.58 kg/m²  Body mass index is 38.58 kg/m².    Gen: Alert and oriented, No apparent distress.  Eyes:   Extraocular motions intact.  No scleral icterus seen.  Ears:    Ear canals and TMs are clear.  Neck: Neck is supple without lymphadenopathy.  There is a large swelling below the right angle of the jaw.  Unchanged.  Lungs: Normal effort, CTA bilaterally, no wheezes, rhonchi, or rales  CV: Regular rate and rhythm. No murmurs, rubs, or gallops.  No carotid bruits heard.  Abdomen: Soft, nontender, and organomegaly or masses.  Ext: No clubbing, cyanosis, edema.  Neuro:   Cranial nerves II through VIII are grossly intact.  No lateralized signs are seen.    Labs: Ordered    Assessment & Plan:     69 y.o. female with the following -     1. PAF (paroxysmal atrial fibrillation) ([facility]) 1/2014  This " is a chronic problem.  Referral back to cardiology for evaluation.  She was told to take an aspirin a day and try to take her metoprolol on a more regular basis.  Avoid factors that can aggravate the condition.    - REFERRAL TO CARDIOLOGY  - CBC WITH DIFFERENTIAL; Future    2. Essential hypertension  This is a chronic stable condition.  - Comp Metabolic Panel; Future  - Lipid Profile; Future  - URINALYSIS,CULTURE IF INDICATED; Future  - ESTIMATED GFR; Future    3. Need for vaccination  Flu vaccine given today at her request.  - Influenza Vaccine, High Dose (65+ Only)    4. Lymphadenopathy of right cervical region  This is a chronic problem.  Patient states it was found to be a cyst.  If it changes she will need further evaluation.    5. Tobacco use  This is a chronic problem.  She continues to smoke and declines lung cancer screening.    6. Dyslipidemia  This is a chronic problem.  Continue to be on healthy diet.      Return in about 6 months (around 9/5/2025) for Long.    Please note that this dictation was created using voice recognition software. I have made every reasonable attempt to correct obvious errors, but I expect that there are errors of grammar and possibly content that I did not discover before finalizing the note.

## 2025-03-05 NOTE — ASSESSMENT & PLAN NOTE
This is a chronic problem.  She has mild elevation of her LDL but declines any medication if she treats it with diet.

## 2025-03-05 NOTE — ASSESSMENT & PLAN NOTE
This is a chronic problem.  She did see ENT last fall for the swelling under the right angle of her jaw.  She states an ultrasound was done and it showed that it was a cyst.  We do not have the final records.

## 2025-03-05 NOTE — ASSESSMENT & PLAN NOTE
This is a chronic issue.  Even though she takes her metoprolol intermittently her blood pressure remains under good control.

## 2025-03-06 ENCOUNTER — RESULTS FOLLOW-UP (OUTPATIENT)
Dept: MEDICAL GROUP | Facility: PHYSICIAN GROUP | Age: 70
End: 2025-03-06
Payer: MEDICARE

## 2025-03-19 ENCOUNTER — TELEPHONE (OUTPATIENT)
Dept: HEALTH INFORMATION MANAGEMENT | Facility: OTHER | Age: 70
End: 2025-03-19
Payer: MEDICARE

## 2025-05-04 DIAGNOSIS — I48.0 PAF (PAROXYSMAL ATRIAL FIBRILLATION) (HCC): ICD-10-CM

## 2025-05-04 DIAGNOSIS — I10 ESSENTIAL HYPERTENSION: ICD-10-CM

## 2025-05-07 RX ORDER — METOPROLOL SUCCINATE 25 MG/1
12.5 TABLET, EXTENDED RELEASE ORAL DAILY
Qty: 45 TABLET | Refills: 3 | Status: SHIPPED | OUTPATIENT
Start: 2025-05-07

## 2025-06-06 ENCOUNTER — OFFICE VISIT (OUTPATIENT)
Dept: MEDICAL GROUP | Facility: PHYSICIAN GROUP | Age: 70
End: 2025-06-06
Payer: MEDICARE

## 2025-06-06 VITALS
DIASTOLIC BLOOD PRESSURE: 74 MMHG | WEIGHT: 241 LBS | BODY MASS INDEX: 38.73 KG/M2 | HEIGHT: 66 IN | TEMPERATURE: 97.8 F | OXYGEN SATURATION: 95 % | HEART RATE: 86 BPM | SYSTOLIC BLOOD PRESSURE: 126 MMHG | RESPIRATION RATE: 16 BRPM

## 2025-06-06 DIAGNOSIS — M25.561 ACUTE PAIN OF RIGHT KNEE: ICD-10-CM

## 2025-06-06 DIAGNOSIS — R22.1 NECK MASS: Primary | ICD-10-CM

## 2025-06-06 PROBLEM — R59.0 LYMPHADENOPATHY OF RIGHT CERVICAL REGION: Status: RESOLVED | Noted: 2024-04-02 | Resolved: 2025-06-06

## 2025-06-06 PROCEDURE — 3074F SYST BP LT 130 MM HG: CPT | Performed by: FAMILY MEDICINE

## 2025-06-06 PROCEDURE — 3078F DIAST BP <80 MM HG: CPT | Performed by: FAMILY MEDICINE

## 2025-06-06 PROCEDURE — 99213 OFFICE O/P EST LOW 20 MIN: CPT | Performed by: FAMILY MEDICINE

## 2025-06-06 RX ORDER — MELOXICAM 7.5 MG/1
7.5 TABLET ORAL DAILY
Qty: 30 TABLET | Refills: 1 | Status: SHIPPED | OUTPATIENT
Start: 2025-06-06

## 2025-06-06 ASSESSMENT — FIBROSIS 4 INDEX: FIB4 SCORE: 1.15

## 2025-06-06 NOTE — PROGRESS NOTES
"Subjective:     CC: Here for couple of issues.    HPI:   Rica presents today with the following medical concerns:    Neck mass  This is a chronic problem.  Patient has been evaluated by ENT last year for right neck mass.  They thought that it was due to a cyst.  Patient states it does go up and down in size.  She was to recheck an ultrasound a couple months ago but has not done it as of yet.   I reminded her to do so and gave her the phone number to call radiology to set that up.    Acute pain of right knee  This is a chronic recurrent problem.  With recent exacerbation.  She states that her right knee has been bothering her when walking and is causing her to limp.  No recent injury.  Symptoms and feels like it is going to give away and she feels pain to the backside of the knee.  She did have an injury to it a year and a half ago and x-ray at that time showed arthritic changes.  She has not tried taking anything for it.    Past Medical History[1]    Social History[2]    Current Medications and Prescriptions Ordered in Epic[3]    Allergies:  Patient has no known allergies.    Health Maintenance: Completed    ROS:  Gen: no fevers/chills, no changes in weight  Eyes: no changes in vision  ENT: no sore throat, no hearing loss, no bloody nose  Pulm: no sob, no cough  CV: no chest pain, no palpitations  GI: no nausea/vomiting, no diarrhea  : no dysuria  MSk: no myalgias  Skin: no rash  Neuro: no headaches, no numbness/tingling  Heme/Lymph: no easy bruising      Objective:       Exam:  /74 (BP Location: Right arm, Patient Position: Sitting, BP Cuff Size: Adult)   Pulse 86   Temp 36.6 °C (97.8 °F) (Temporal)   Resp 16   Ht 1.676 m (5' 6\")   Wt 109 kg (241 lb)   SpO2 95%   BMI 38.90 kg/m²  Body mass index is 38.9 kg/m².    Gen: Alert and oriented, No apparent distress.  Neck: Neck is supple  Ext: No clubbing, cyanosis, edema.  There is mild crepitus on range of motion of the right knee.  There is a small " effusion present and mild discomfort with slight swelling to the popliteal space.  She does have a slight limp on walking.  Ligaments appear grossly intact.        Assessment & Plan:     69 y.o. female with the following -     1. Neck mass (Primary)  This is a chronic problem.  Patient reminded to do her ultrasound and follow-up with ENT.  I discussed with her that I think it is a cyst and nothing needs to be done about it unless it becomes bothersome.    2. Acute pain of right knee  This is a chronic recurrent problem.  I told her she most likely has osteoarthritis with an effusion.  Will try her on meloxicam.  If is not helping in 1 to 2 weeks we could double the dose.  If that does not help would recommend she see orthopedics for possible evaluation and steroid injection.      Return if symptoms worsen or fail to improve.    Please note that this dictation was created using voice recognition software. I have made every reasonable attempt to correct obvious errors, but I expect that there are errors of grammar and possibly content that I did not discover before finalizing the note.             [1]   Past Medical History:  Diagnosis Date    Anesthesia     Hyperlipidemia     Hypertension     Incomplete right bundle branch block (RBBB)     PAF (paroxysmal atrial fibrillation) (HCC) 1/2014     PONV (postoperative nausea and vomiting)     Sleep apnea     Tobacco abuse    [2]   Social History  Tobacco Use    Smoking status: Every Day     Current packs/day: 1.00     Average packs/day: 0.7 packs/day for 81.3 years (56.3 ttl pk-yrs)     Types: Cigarettes     Start date: 1/30/1994    Smokeless tobacco: Never    Tobacco comments:     4-5 cigarettes a day   Vaping Use    Vaping status: Never Used   Substance Use Topics    Alcohol use: No     Alcohol/week: 0.0 oz    Drug use: No   [3]   Current Outpatient Medications Ordered in Epic   Medication Sig Dispense Refill    meloxicam (MOBIC) 7.5 MG Tab Take 1 Tablet by mouth every  day. 30 Tablet 1    metoprolol SR (TOPROL XL) 25 MG TABLET SR 24 HR Take 1/2 (one-half) tablet by mouth once daily 45 Tablet 3    Cholecalciferol (VITAMIN D3) 2000 UNIT Cap Take  by mouth.      Potassium (POTASSIMIN PO) Take  by mouth.      MAGNESIUM PO Take  by mouth.      B Complex Vitamins (VITAMIN B COMPLEX PO) Take  by mouth.       No current Epic-ordered facility-administered medications on file.

## 2025-06-06 NOTE — ASSESSMENT & PLAN NOTE
This is a chronic problem.  Patient has been evaluated by ENT last year for right neck mass.  They thought that it was due to a cyst.  Patient states it does go up and down in size.  She was to recheck an ultrasound a couple months ago but has not done it as of yet.   I reminded her to do so and gave her the phone number to call radiology to set that up.

## 2025-06-06 NOTE — ASSESSMENT & PLAN NOTE
This is a chronic recurrent problem.  With recent exacerbation.  She states that her right knee has been bothering her when walking and is causing her to limp.  No recent injury.  Symptoms and feels like it is going to give away and she feels pain to the backside of the knee.  She did have an injury to it a year and a half ago and x-ray at that time showed arthritic changes.  She has not tried taking anything for it.

## 2025-08-01 ENCOUNTER — APPOINTMENT (OUTPATIENT)
Dept: RADIOLOGY | Facility: MEDICAL CENTER | Age: 70
End: 2025-08-01
Attending: EMERGENCY MEDICINE
Payer: MEDICARE

## 2025-08-01 ENCOUNTER — OFFICE VISIT (OUTPATIENT)
Dept: URGENT CARE | Facility: PHYSICIAN GROUP | Age: 70
End: 2025-08-01
Payer: MEDICARE

## 2025-08-01 ENCOUNTER — HOSPITAL ENCOUNTER (EMERGENCY)
Facility: MEDICAL CENTER | Age: 70
End: 2025-08-01
Attending: EMERGENCY MEDICINE
Payer: MEDICARE

## 2025-08-01 VITALS
BODY MASS INDEX: 39.53 KG/M2 | HEART RATE: 87 BPM | RESPIRATION RATE: 16 BRPM | HEIGHT: 66 IN | WEIGHT: 246 LBS | SYSTOLIC BLOOD PRESSURE: 128 MMHG | OXYGEN SATURATION: 98 % | DIASTOLIC BLOOD PRESSURE: 60 MMHG | TEMPERATURE: 98 F

## 2025-08-01 VITALS
TEMPERATURE: 97.2 F | HEART RATE: 74 BPM | HEIGHT: 66 IN | OXYGEN SATURATION: 95 % | WEIGHT: 246.91 LBS | DIASTOLIC BLOOD PRESSURE: 79 MMHG | RESPIRATION RATE: 20 BRPM | SYSTOLIC BLOOD PRESSURE: 128 MMHG | BODY MASS INDEX: 39.68 KG/M2

## 2025-08-01 DIAGNOSIS — R07.9 CHEST PAIN, UNSPECIFIED TYPE: Primary | ICD-10-CM

## 2025-08-01 LAB
ALBUMIN SERPL BCP-MCNC: 4 G/DL (ref 3.2–4.9)
ALBUMIN/GLOB SERPL: 1.1 G/DL
ALP SERPL-CCNC: 96 U/L (ref 30–99)
ALT SERPL-CCNC: 14 U/L (ref 2–50)
ANION GAP SERPL CALC-SCNC: 10 MMOL/L (ref 7–16)
AST SERPL-CCNC: 19 U/L (ref 12–45)
BASOPHILS # BLD AUTO: 0.8 % (ref 0–1.8)
BASOPHILS # BLD: 0.06 K/UL (ref 0–0.12)
BILIRUB SERPL-MCNC: 0.3 MG/DL (ref 0.1–1.5)
BUN SERPL-MCNC: 12 MG/DL (ref 8–22)
CALCIUM ALBUM COR SERPL-MCNC: 9.1 MG/DL (ref 8.5–10.5)
CALCIUM SERPL-MCNC: 9.1 MG/DL (ref 8.5–10.5)
CHLORIDE SERPL-SCNC: 103 MMOL/L (ref 96–112)
CO2 SERPL-SCNC: 24 MMOL/L (ref 20–33)
CREAT SERPL-MCNC: 0.93 MG/DL (ref 0.5–1.4)
EKG IMPRESSION: NORMAL
EOSINOPHIL # BLD AUTO: 0.17 K/UL (ref 0–0.51)
EOSINOPHIL NFR BLD: 2.1 % (ref 0–6.9)
ERYTHROCYTE [DISTWIDTH] IN BLOOD BY AUTOMATED COUNT: 42.9 FL (ref 35.9–50)
GFR SERPLBLD CREATININE-BSD FMLA CKD-EPI: 65 ML/MIN/1.73 M 2
GLOBULIN SER CALC-MCNC: 3.5 G/DL (ref 1.9–3.5)
GLUCOSE SERPL-MCNC: 92 MG/DL (ref 65–99)
HCT VFR BLD AUTO: 44.6 % (ref 37–47)
HGB BLD-MCNC: 15.1 G/DL (ref 12–16)
IMM GRANULOCYTES # BLD AUTO: 0.03 K/UL (ref 0–0.11)
IMM GRANULOCYTES NFR BLD AUTO: 0.4 % (ref 0–0.9)
LYMPHOCYTES # BLD AUTO: 1.89 K/UL (ref 1–4.8)
LYMPHOCYTES NFR BLD: 23.9 % (ref 22–41)
MCH RBC QN AUTO: 30.6 PG (ref 27–33)
MCHC RBC AUTO-ENTMCNC: 33.9 G/DL (ref 32.2–35.5)
MCV RBC AUTO: 90.5 FL (ref 81.4–97.8)
MONOCYTES # BLD AUTO: 0.55 K/UL (ref 0–0.85)
MONOCYTES NFR BLD AUTO: 6.9 % (ref 0–13.4)
NEUTROPHILS # BLD AUTO: 5.22 K/UL (ref 1.82–7.42)
NEUTROPHILS NFR BLD: 65.9 % (ref 44–72)
NRBC # BLD AUTO: 0 K/UL
NRBC BLD-RTO: 0 /100 WBC (ref 0–0.2)
NT-PROBNP SERPL IA-MCNC: 145 PG/ML (ref 0–125)
PLATELET # BLD AUTO: 226 K/UL (ref 164–446)
PMV BLD AUTO: 8.8 FL (ref 9–12.9)
POTASSIUM SERPL-SCNC: 4.4 MMOL/L (ref 3.6–5.5)
PROT SERPL-MCNC: 7.5 G/DL (ref 6–8.2)
RBC # BLD AUTO: 4.93 M/UL (ref 4.2–5.4)
SODIUM SERPL-SCNC: 137 MMOL/L (ref 135–145)
TROPONIN T SERPL-MCNC: 8 NG/L (ref 6–19)
TROPONIN T SERPL-MCNC: 8 NG/L (ref 6–19)
WBC # BLD AUTO: 7.9 K/UL (ref 4.8–10.8)

## 2025-08-01 PROCEDURE — 85025 COMPLETE CBC W/AUTO DIFF WBC: CPT

## 2025-08-01 PROCEDURE — 71045 X-RAY EXAM CHEST 1 VIEW: CPT

## 2025-08-01 PROCEDURE — 93005 ELECTROCARDIOGRAM TRACING: CPT | Mod: TC | Performed by: EMERGENCY MEDICINE

## 2025-08-01 PROCEDURE — 83880 ASSAY OF NATRIURETIC PEPTIDE: CPT

## 2025-08-01 PROCEDURE — 3078F DIAST BP <80 MM HG: CPT | Performed by: NURSE PRACTITIONER

## 2025-08-01 PROCEDURE — 84484 ASSAY OF TROPONIN QUANT: CPT

## 2025-08-01 PROCEDURE — 99214 OFFICE O/P EST MOD 30 MIN: CPT | Performed by: NURSE PRACTITIONER

## 2025-08-01 PROCEDURE — 80053 COMPREHEN METABOLIC PANEL: CPT

## 2025-08-01 PROCEDURE — 93005 ELECTROCARDIOGRAM TRACING: CPT | Mod: TC

## 2025-08-01 PROCEDURE — 3074F SYST BP LT 130 MM HG: CPT | Performed by: NURSE PRACTITIONER

## 2025-08-01 PROCEDURE — 99284 EMERGENCY DEPT VISIT MOD MDM: CPT

## 2025-08-01 PROCEDURE — 93000 ELECTROCARDIOGRAM COMPLETE: CPT | Performed by: NURSE PRACTITIONER

## 2025-08-01 PROCEDURE — 1125F AMNT PAIN NOTED PAIN PRSNT: CPT | Performed by: NURSE PRACTITIONER

## 2025-08-01 PROCEDURE — 36415 COLL VENOUS BLD VENIPUNCTURE: CPT

## 2025-08-01 ASSESSMENT — HEART SCORE
TROPONIN: LESS THAN OR EQUAL TO NORMAL LIMIT
HISTORY: SLIGHTLY SUSPICIOUS
HISTORY: SLIGHTLY SUSPICIOUS
RISK FACTORS: 1-2 RISK FACTORS
AGE: 65+
AGE: 65+
RISK FACTORS: 1-2 RISK FACTORS
ECG: NORMAL
TROPONIN: LESS THAN OR EQUAL TO NORMAL LIMIT
HEART SCORE: 3

## 2025-08-01 ASSESSMENT — PAIN SCALES - GENERAL: PAINLEVEL_OUTOF10: 4=SLIGHT-MODERATE PAIN

## 2025-08-01 ASSESSMENT — FIBROSIS 4 INDEX
FIB4 SCORE: 1.15
FIB4 SCORE: 1.15

## 2025-08-04 ENCOUNTER — APPOINTMENT (OUTPATIENT)
Dept: MEDICAL GROUP | Facility: PHYSICIAN GROUP | Age: 70
End: 2025-08-04
Payer: MEDICARE